# Patient Record
Sex: FEMALE | Race: BLACK OR AFRICAN AMERICAN | Employment: OTHER | URBAN - METROPOLITAN AREA
[De-identification: names, ages, dates, MRNs, and addresses within clinical notes are randomized per-mention and may not be internally consistent; named-entity substitution may affect disease eponyms.]

---

## 2017-05-16 ENCOUNTER — HOSPITAL ENCOUNTER (OUTPATIENT)
Dept: BARIATRICS/WEIGHT MGMT | Age: 57
Discharge: HOME | End: 2017-05-16

## 2017-05-17 ENCOUNTER — DOCUMENTATION ONLY (OUTPATIENT)
Dept: BARIATRICS/WEIGHT MGMT | Age: 57
End: 2017-05-17

## 2017-05-17 NOTE — PROGRESS NOTES
[address] Loss Keely DIONNE Sun [address] [facility], [address]      Patient's Name: Otriz Isaacs   Age: 60 y.o. YOB: 1960   Sex: female    Date:   5/17/2017    Height: 5 f 2 Weight:    161      Lbs. BMI: 29.5     Surgery Date: 6/27/16    Starting Weight: 247   Last Recorded Weight: 12/30/16: 180  Overall Pounds Lost: 86     Procedure: Gastric Bypass    Lowest Weight patient has achieved since surgery: Current    How long has patient been at this weight for: 2 wekes    Other Pertinent Information:     Diet History (reported by patient on diet history form)    Do you smoke: None    Alcohol Intake: 1 drinks at a time, Not often times a week. Meals per day: 3    Portion sizes ~: Whole meal is the size of a deck of card    Simple sugar intake: She states she is not regularly eating sweets. Experiencing dumping syndrome: She states she has had something sweet and did indeed getting dumping syndrome    Carbohydrate intake: Not routinely    Symptoms that occur when carbohydrates are consumed: None    Ounces of fluid consumed per day: 32-64 ounces. Discussed with patient focusing on 64 ounces regluarly    Fluids being consumed: Water and 1 cup of coffee per day    Patient is sometimes  drinking protein drinks    Patient is snacking on: fruit or veggie straws    Exercise History    Patient is doing stair steps for exercise. Vitamins    Patient is taking 2 Multivitamins per day    Patient is taking Calcium in the form of pill. Patient is taking 315 mg per day. Patient is taking 1000 mcg of Vitamin B12. Patient is taking 2000 IU of Vitamin D 3 today. Summary:  Patient attended 9 month post-op class. Weight loss is at 86 pounds. I have reinforced the key diet principles to the patient. Patient was instructed to follow a 3 meal a day routine.   I have reinforced the importance of filling up on meat and vegetables and avoiding carbohydrates. I have talked with patient about reactive hypoglycemia and although carbohydrates are not good from a weight-management point of view, they are actually very dangerous and should be avoided. Patient was discouraged from still doing protein drinks at this point and was encouraged to eat solid food of meat and vegetables. If patient is getting hungry between meals focus on meat and vegetables and a list of food choices was reviewed with patient. Reinforced to patient the importance of being properly hydrated and the need to get 64 ounces of fluid in per day. Reinforced to patient the need to do 30 minutes of exercise per day. We also spent some time talking about the vitamins and the importance of taking them. Reinforced the dosage of vitamins to patient. Patient was reminded that the vitamins are lifelong. Other Pertinent Information: Patient was encouraged to increase fluid intake and watch her fruit intake.   Re-educated on calcium dosing      Michael Obrien 87 RD  5/17/2017

## 2017-06-28 ENCOUNTER — TELEPHONE (OUTPATIENT)
Dept: SURGERY | Age: 57
End: 2017-06-28

## 2017-06-29 ENCOUNTER — OFFICE VISIT (OUTPATIENT)
Dept: SURGERY | Age: 57
End: 2017-06-29

## 2017-06-29 ENCOUNTER — HOSPITAL ENCOUNTER (OUTPATIENT)
Dept: LAB | Age: 57
Discharge: HOME | End: 2017-06-29
Payer: COMMERCIAL

## 2017-06-29 VITALS
WEIGHT: 156 LBS | BODY MASS INDEX: 28.71 KG/M2 | SYSTOLIC BLOOD PRESSURE: 112 MMHG | HEART RATE: 73 BPM | TEMPERATURE: 97.7 F | DIASTOLIC BLOOD PRESSURE: 82 MMHG | OXYGEN SATURATION: 98 % | HEIGHT: 62 IN | RESPIRATION RATE: 16 BRPM

## 2017-06-29 DIAGNOSIS — R53.82 CHRONIC FATIGUE: ICD-10-CM

## 2017-06-29 DIAGNOSIS — Z98.84 GASTRIC BYPASS STATUS FOR OBESITY: ICD-10-CM

## 2017-06-29 DIAGNOSIS — K91.2 POSTOPERATIVE MALABSORPTION: ICD-10-CM

## 2017-06-29 DIAGNOSIS — K91.2 POSTOPERATIVE MALABSORPTION: Primary | ICD-10-CM

## 2017-06-29 LAB
25(OH)D3 SERPL-MCNC: 37.6 NG/ML (ref 30–100)
ALBUMIN SERPL BCP-MCNC: 3.7 G/DL (ref 3.4–5)
ALBUMIN/GLOB SERPL: 1.4 {RATIO} (ref 0.8–1.7)
ALP SERPL-CCNC: 90 U/L (ref 45–117)
ALT SERPL-CCNC: 41 U/L (ref 13–56)
ANION GAP BLD CALC-SCNC: 7 MMOL/L (ref 3–18)
AST SERPL W P-5'-P-CCNC: 28 U/L (ref 15–37)
BASOPHILS # BLD AUTO: 0 K/UL (ref 0–0.06)
BASOPHILS # BLD: 0 % (ref 0–2)
BILIRUB SERPL-MCNC: 0.4 MG/DL (ref 0.2–1)
BUN SERPL-MCNC: 24 MG/DL (ref 7–18)
BUN/CREAT SERPL: 28 (ref 12–20)
CALCIUM SERPL-MCNC: 9.2 MG/DL (ref 8.5–10.1)
CHLORIDE SERPL-SCNC: 108 MMOL/L (ref 100–108)
CO2 SERPL-SCNC: 29 MMOL/L (ref 21–32)
CREAT SERPL-MCNC: 0.87 MG/DL (ref 0.6–1.3)
DIFFERENTIAL METHOD BLD: NORMAL
EOSINOPHIL # BLD: 0.3 K/UL (ref 0–0.4)
EOSINOPHIL NFR BLD: 4 % (ref 0–5)
ERYTHROCYTE [DISTWIDTH] IN BLOOD BY AUTOMATED COUNT: 13.8 % (ref 11.6–14.5)
FERRITIN SERPL-MCNC: 34 NG/ML (ref 8–388)
FOLATE SERPL-MCNC: >20 NG/ML (ref 3.1–17.5)
GLOBULIN SER CALC-MCNC: 2.6 G/DL (ref 2–4)
GLUCOSE SERPL-MCNC: 77 MG/DL (ref 74–99)
HCT VFR BLD AUTO: 38.2 % (ref 35–45)
HGB BLD-MCNC: 12.7 G/DL (ref 12–16)
IRON SERPL-MCNC: 85 UG/DL (ref 50–175)
LYMPHOCYTES # BLD AUTO: 32 % (ref 21–52)
LYMPHOCYTES # BLD: 2.2 K/UL (ref 0.9–3.6)
MCH RBC QN AUTO: 30 PG (ref 24–34)
MCHC RBC AUTO-ENTMCNC: 33.2 G/DL (ref 31–37)
MCV RBC AUTO: 90.1 FL (ref 74–97)
MONOCYTES # BLD: 0.4 K/UL (ref 0.05–1.2)
MONOCYTES NFR BLD AUTO: 6 % (ref 3–10)
NEUTS SEG # BLD: 4.1 K/UL (ref 1.8–8)
NEUTS SEG NFR BLD AUTO: 58 % (ref 40–73)
PLATELET # BLD AUTO: 305 K/UL (ref 135–420)
PMV BLD AUTO: 10.5 FL (ref 9.2–11.8)
POTASSIUM SERPL-SCNC: 4.1 MMOL/L (ref 3.5–5.5)
PROT SERPL-MCNC: 6.3 G/DL (ref 6.4–8.2)
RBC # BLD AUTO: 4.24 M/UL (ref 4.2–5.3)
SODIUM SERPL-SCNC: 144 MMOL/L (ref 136–145)
VIT B12 SERPL-MCNC: >2000 PG/ML (ref 211–911)
WBC # BLD AUTO: 7 K/UL (ref 4.6–13.2)

## 2017-06-29 PROCEDURE — 83540 ASSAY OF IRON: CPT | Performed by: PHYSICIAN ASSISTANT

## 2017-06-29 PROCEDURE — 82306 VITAMIN D 25 HYDROXY: CPT | Performed by: PHYSICIAN ASSISTANT

## 2017-06-29 PROCEDURE — 36415 COLL VENOUS BLD VENIPUNCTURE: CPT | Performed by: PHYSICIAN ASSISTANT

## 2017-06-29 PROCEDURE — 82728 ASSAY OF FERRITIN: CPT | Performed by: PHYSICIAN ASSISTANT

## 2017-06-29 PROCEDURE — 84425 ASSAY OF VITAMIN B-1: CPT | Performed by: PHYSICIAN ASSISTANT

## 2017-06-29 PROCEDURE — 85025 COMPLETE CBC W/AUTO DIFF WBC: CPT | Performed by: PHYSICIAN ASSISTANT

## 2017-06-29 PROCEDURE — 80053 COMPREHEN METABOLIC PANEL: CPT | Performed by: PHYSICIAN ASSISTANT

## 2017-06-29 PROCEDURE — 82607 VITAMIN B-12: CPT | Performed by: PHYSICIAN ASSISTANT

## 2017-06-29 RX ORDER — MENTHOL
1000 GEL (GRAM) TOPICAL DAILY
COMMUNITY

## 2017-06-30 NOTE — PROGRESS NOTES
Blanca Rodriguez is a 59 y.o. female is now 1 years status post laparoscopic gastric bypass surgery. Doing well overall. Currently on a solid meats/veggies diet without difficulty. Taking in 60+oz water,  50+g protein. 30min of activity 3 days a week. Bowel movements are regular. The patient is not having any pain. . She is compliant with multivitamins, calcium, Vit D and B12 supplements. She is in [address] and is doing well      Weight Loss Metrics 6/29/2017 12/29/2016 9/26/2016 7/14/2016 6/28/2016 6/27/2016 6/6/2016   Pre op / Initial Wt - - - - - - -   Today's Wt 156 lb 180 lb 195 lb 220 lb 232 lb 3.2 oz - 234 lb   BMI 28.53 kg/m2 32.92 kg/m2 35.67 kg/m2 40.23 kg/m2 - 42.46 kg/m2 45.7 kg/m2   Ideal Body Wt - - - - - - -   Excess Body Wt - - - - - - -   Goal Wt - - - - - - -   Wt loss to date - - - - - - -   % Wt Loss - - - - - - -   80% EBW - - - - - - -       Body mass index is 28.53 kg/(m^2). Comorbidities:    Hypertension: resolved  Diabetes: resolved  Obstructive Sleep Apnea: resolved  Hyperlipidemia: not applicable  Stress Urinary Incontinence: not applicable  Gastroesophageal Reflux: not applicable  Weight related arthropathy:not applicable        Past Medical History:   Diagnosis Date    Adverse effect of anesthesia     woke up during cholecystectomy    Asthma     Diabetes ([facility] [address]     Hypertension     Sleep apnea     uses cpap        Past Surgical History:   Procedure Laterality Date    HX ABDOMINAL LAPAROSCOPY  1978    HX APPENDECTOMY      HX CHOLECYSTECTOMY      HX HYSTERECTOMY      LAP GASTRIC BYPASS/NEAL-EN-Y  6-27-16    Dr. Telly Alamo       Current Outpatient Prescriptions   Medication Sig Dispense Refill    vitamin e (E GEMS) 1,000 unit capsule Take 1,000 Units by mouth daily.  potassium 99 mg tablet Take 99 mg by mouth daily.  cyanocobalamin (VITAMIN B-12) 1,000 mcg sublingual tablet Take 1,000 mcg by mouth daily.       cholecalciferol, vitamin D3, 2,000 unit tab Take  by mouth.  pediatric multivitamins chewable tablet Take 1 Tab by mouth daily.  calcium citrate 200 mg (950 mg) tablet Take 500 mg by mouth daily. Allergies   Allergen Reactions    Codeine Itching     Migraine    Erythromycin Nausea and Vomiting    Percocet [Oxycodone-Acetaminophen] Hives     Denies at present. Thinks hives were from stress. ROS:  Review of Systems   All other systems reviewed and are negative. Physicial Exam:  Visit Vitals    /82    Pulse 73    Temp 97.7 °F (36.5 °C) (Oral)    Resp 16    Ht 5' 2\" (1.575 m)    Wt 70.8 kg (156 lb)    SpO2 98%    BMI 28.53 kg/m2     Physical Exam   Constitutional: She is oriented to person, place, and time and well-developed, well-nourished, and in no distress. Pulmonary/Chest: Effort normal.   Abdominal: Soft. She exhibits no distension and no mass. There is no tenderness. There is no rebound and no guarding. Musculoskeletal: Normal range of motion. Neurological: She is alert and oriented to person, place, and time. Gait normal.   Skin: Skin is warm and dry. No rash noted. No erythema. No pallor. Psychiatric: Mood, memory, affect and judgment normal.       Labs: not done    Assessment/Plan: Pt is currently 1 year s/p laparoscopic gastric bypass surgery with a total weight loss of 78lbs to date, doing well overall  Labs now  Stressed importance of hydration with SF clear liquids until urine clear. cont with food content mainly meats/veggies. Encouraged support group attendance  Advised exercise program of 150 minutes/wk    Counseling>50% of 30 minute visit  F/u 6 months, labs prior, sooner as needed.   [address] [facility], [place]-

## 2017-07-01 LAB — VIT B1 BLD-SCNC: 142.5 NMOL/L (ref 66.5–200)

## 2017-07-13 DIAGNOSIS — K91.2 POSTSURGICAL MALABSORPTION: ICD-10-CM

## 2017-07-13 DIAGNOSIS — E66.01 MORBID OBESITY WITH BMI OF 40.0-44.9, ADULT (HCC): ICD-10-CM

## 2018-01-15 ENCOUNTER — OFFICE VISIT (OUTPATIENT)
Dept: SURGERY | Age: 58
End: 2018-01-15

## 2018-01-15 VITALS
HEART RATE: 66 BPM | DIASTOLIC BLOOD PRESSURE: 86 MMHG | BODY MASS INDEX: 28.13 KG/M2 | RESPIRATION RATE: 18 BRPM | WEIGHT: 153.8 LBS | SYSTOLIC BLOOD PRESSURE: 130 MMHG | TEMPERATURE: 98.7 F

## 2018-01-15 DIAGNOSIS — K91.2 POSTOPERATIVE MALABSORPTION: Primary | ICD-10-CM

## 2018-01-15 DIAGNOSIS — Z98.84 GASTRIC BYPASS STATUS FOR OBESITY: ICD-10-CM

## 2018-01-15 RX ORDER — GLUCOSAMINE/CHONDR SU A SOD 750-600 MG
TABLET ORAL
COMMUNITY

## 2018-01-15 RX ORDER — ASCORBIC ACID 500 MG
1000 TABLET ORAL DAILY
COMMUNITY

## 2018-01-15 NOTE — PROGRESS NOTES
Benedict Vallejo is a 59 y.o. female is now 18 months status post laparoscopic gastric bypass surgery. Doing well overall. Currently on a solid meats/veggies diet without difficulty. Taking in 50+oz water,  60+g protein. 60min of activity 7 days a week. Bowel movements are alternating constipation and regularity. The patient is not having any pain. . She is compliant with multivitamins, calcium, Vit D and B12 supplements. Weight Loss Metrics 1/15/2018 6/29/2017 12/29/2016 9/26/2016 7/14/2016 6/28/2016 6/27/2016   Pre op / Initial Wt - - - - - - -   Today's Wt 153 lb 12.8 oz 156 lb 180 lb 195 lb 220 lb 232 lb 3.2 oz -   BMI 28.13 kg/m2 28.53 kg/m2 32.92 kg/m2 35.67 kg/m2 40.23 kg/m2 - 42.46 kg/m2   Ideal Body Wt - - - - - - -   Excess Body Wt - - - - - - -   Goal Wt - - - - - - -   Wt loss to date - - - - - - -   % Wt Loss - - - - - - -   80% EBW - - - - - - -       Body mass index is 28.13 kg/(m^2). Comorbidities:    Hypertension: resolved  Diabetes: resolved  Obstructive Sleep Apnea: resolved  Hyperlipidemia: not applicable  Stress Urinary Incontinence: not applicable  Gastroesophageal Reflux: not applicable  Weight related arthropathy:not applicable        Past Medical History:   Diagnosis Date    Adverse effect of anesthesia     woke up during cholecystectomy    Asthma     Diabetes ([facility] [address]     Hypertension     Sleep apnea     uses cpap        Past Surgical History:   Procedure Laterality Date    HX ABDOMINAL LAPAROSCOPY  1978    HX APPENDECTOMY      HX CHOLECYSTECTOMY      HX HYSTERECTOMY      LAP GASTRIC BYPASS/NEAL-EN-Y  6-27-16    Dr. Shelia Galeas       Current Outpatient Prescriptions   Medication Sig Dispense Refill    Biotin 2,500 mcg cap Take  by mouth.  ascorbic acid, vitamin C, (VITAMIN C) 500 mg tablet Take  by mouth.  vitamin e (E GEMS) 1,000 unit capsule Take 1,000 Units by mouth daily.  potassium 99 mg tablet Take 99 mg by mouth daily.       cyanocobalamin (VITAMIN B-12) 1,000 mcg sublingual tablet Take 1,000 mcg by mouth daily.  cholecalciferol, vitamin D3, 2,000 unit tab Take  by mouth.  pediatric multivitamins chewable tablet Take 1 Tab by mouth daily.  calcium citrate 200 mg (950 mg) tablet Take 500 mg by mouth daily. Allergies   Allergen Reactions    Codeine Itching     Migraine    Erythromycin Nausea and Vomiting    Percocet [Oxycodone-Acetaminophen] Hives     Denies at present. Thinks hives were from stress. ROS:  Review of Systems   Gastrointestinal: Positive for constipation. All other systems reviewed and are negative. Physicial Exam:  Visit Vitals    /86    Pulse 66    Temp 98.7 °F (37.1 °C)    Resp 18    Wt 69.8 kg (153 lb 12.8 oz)    BMI 28.13 kg/m2     Physical Exam   Constitutional: She is oriented to person, place, and time and well-developed, well-nourished, and in no distress. Pulmonary/Chest: Effort normal.   Abdominal: Soft. She exhibits no distension and no mass. There is no tenderness. There is no rebound and no guarding. Musculoskeletal: Normal range of motion. Neurological: She is alert and oriented to person, place, and time. Gait normal.   Skin: Skin is warm and dry. Psychiatric: Mood, memory, affect and judgment normal.       Labs: not performed for this appt--last labs 6/17 wnl    Assessment/Plan: Pt is currently 18 months s/p laparoscopic gastric bypass surgery with a total weight loss of 97lbs to date, doing very well  Labs were reviewed and pt was instructed to cont all supplements  Stressed importance of hydration with SF clear liquids until urine clear. Cont with food content mainly meats/veggies. Encouraged support group attendance  Advised cont excellent exercise program  Counseling>50% of 30 minute visit  F/u 6 months, labs prior, sooner as needed.   [facility], PA-C

## 2018-01-15 NOTE — MR AVS SNAPSHOT
[address] [facility] 240 200 [facility] 
[phone] Patient: Judge Alvarado MRN: [id number] Visit Information Date & Time Provider Department Dept. Phone Encounter #  
 1/15/2018  3:00 PM NABOR Vasquez [phone] Your Appointments 7/16/2018  9:00 AM  
Follow Up with NABOR Nye 33 ([facility]) Appt Note: 6 month f/up [address] [place] [address] [id number] [address] [facility] Upcoming Health Maintenance Date Due Hepatitis C Screening 1960 FOOT EXAM Q1 6/17/1968 EYE EXAM RETINAL OR DILATED Q1 6/17/1968 Pneumococcal 19-64 Medium Risk (1 of 1 - PPSV23) 6/17/1977 DTaP/Tdap/Td series (1 - Tdap) 6/17/1979 PAP AKA CERVICAL CYTOLOGY 6/17/1979 BREAST CANCER SCRN MAMMOGRAM 6/17/2008 FOBT Q 1 YEAR AGE 50-75 6/17/2008 LIPID PANEL Q1 6/3/2011 HEMOGLOBIN A1C Q6M 2/15/2017 Influenza Age 5 to Adult 8/1/2017 MICROALBUMIN Q1 8/16/2017 Allergies as of 1/15/2018  Review Complete On: 1/15/2018 By: Gissell Smith PA-C Severity Noted Reaction Type Reactions Codeine  11/17/2015    Itching Migraine Erythromycin  11/17/2015    Nausea and Vomiting Percocet [Oxycodone-acetaminophen]  12/17/2015    Hives Denies at present. Thinks hives were from stress. Current Immunizations  Never Reviewed No immunizations on file. Not reviewed this visit You Were Diagnosed With   
  
 Codes Comments Postoperative malabsorption    -  Primary ICD-10-CM: K91.2 ICD-9-CM: 579.3 Gastric bypass status for obesity     ICD-10-CM: Z98.84 ICD-9-CM: V45.86 Vitals BP Pulse Temp Resp Weight(growth percentile) BMI 130/86 66 98.7 °F (37.1 °C) 18 153 lb 12.8 oz (69.8 kg) 28.13 kg/m2 OB Status Smoking Status Hysterectomy Never Smoker Vitals History BMI and BSA Data Body Mass Index Body Surface Area  
 28.13 kg/m 2 1.75 m 2 Preferred Pharmacy Pharmacy Name Phone [address] [place] [address]. [phone] Your Updated Medication List  
  
   
This list is accurate as of: 1/15/18  4:10 PM.  Always use your most recent med list.  
  
  
  
  
 Biotin 2,500 mcg Cap Take  by mouth.  
  
 calcium citrate 200 mg (950 mg) tablet Take 500 mg by mouth daily. cholecalciferol (vitamin D3) 2,000 unit Tab Take  by mouth.  
  
 pediatric multivitamins chewable tablet Take 1 Tab by mouth daily. potassium 99 mg tablet Take 99 mg by mouth daily. VITAMIN B-12 1,000 mcg sublingual tablet Generic drug:  cyanocobalamin Take 1,000 mcg by mouth daily. VITAMIN C 500 mg tablet Generic drug:  ascorbic acid (vitamin C) Take  by mouth.  
  
 vitamin e 1,000 unit capsule Commonly known as:  E GEMS Take 1,000 Units by mouth daily. To-Do List   
 01/15/2018 Lab:  CBC WITH AUTOMATED DIFF   
  
 01/15/2018 Lab:  FERRITIN   
  
 01/15/2018 Lab:  IRON   
  
 01/15/2018 Lab:  METABOLIC PANEL, COMPREHENSIVE   
  
 01/15/2018 Lab:  VITAMIN B1, WHOLE BLOOD   
  
 01/15/2018 Lab:  VITAMIN B12 & FOLATE   
  
 06/15/2018 Lab:  VITAMIN D, 25 HYDROXY Introducing [facility] & HE[facility] SERVICES! Carmelita Fothergill introduces LYCEEM patient portal. Now you can access parts of your medical record, email your doctor's office, and request medication refills online. 1. In your internet browser, go to [email or web address] 2. Click on the First Time User? Click Here link in the Sign In box. You will see the New Member Sign Up page. 3. Enter your Telebit Access Code exactly as it appears below. You will not need to use this code after youve completed the sign-up process. If you do not sign up before the expiration date, you must request a new code. · Telebit Access Code: [email or web address] Expires: 4/15/2018  3:34 PM 
 
4. Enter the last four digits of your Social Security Number (xxxx) and Date of Birth (mm/dd/yyyy) as indicated and click Submit. You will be taken to the next sign-up page. 5. Create a Casabit ID. This will be your Telebit login ID and cannot be changed, so think of one that is secure and easy to remember. 6. Create a Telebit password. You can change your password at any time. 7. Enter your Password Reset Question and Answer. This can be used at a later time if you forget your password. 8. Enter your e-mail address. You will receive e-mail notification when new information is available in [address] 9. Click Sign Up. You can now view and download portions of your medical record. 10. Click the Download Summary menu link to download a portable copy of your medical information. If you have questions, please visit the Frequently Asked Questions section of the Telebit website. Remember, Telebit is NOT to be used for urgent needs. For medical emergencies, dial 911. Now available from your iPhone and Android! Please provide this summary of care documentation to your next provider. Your primary care clinician is listed as Sheryl Castillo. If you have any questions after today's visit, please call [phone].

## 2018-07-11 ENCOUNTER — HOSPITAL ENCOUNTER (OUTPATIENT)
Dept: LAB | Age: 58
Discharge: HOME | End: 2018-07-11

## 2018-07-11 PROCEDURE — 99001 SPECIMEN HANDLING PT-LAB: CPT | Performed by: PHYSICIAN ASSISTANT

## 2018-07-14 LAB
25(OH)D3+25(OH)D2 SERPL-MCNC: 32 NG/ML (ref 30–100)
ALBUMIN SERPL-MCNC: 3.5 G/DL (ref 3.6–4.8)
ALBUMIN/GLOB SERPL: 1.9 {RATIO} (ref 1.2–2.2)
ALP SERPL-CCNC: 76 IU/L (ref 39–117)
ALT SERPL-CCNC: 39 IU/L (ref 0–32)
AST SERPL-CCNC: 36 IU/L (ref 0–40)
BASOPHILS # BLD AUTO: 0 X10E3/UL (ref 0–0.2)
BASOPHILS NFR BLD AUTO: 0 %
BILIRUB SERPL-MCNC: 0.4 MG/DL (ref 0–1.2)
BUN SERPL-MCNC: 16 MG/DL (ref 8–27)
BUN/CREAT SERPL: 17 (ref 12–28)
CALCIUM SERPL-MCNC: 9.8 MG/DL (ref 8.7–10.3)
CHLORIDE SERPL-SCNC: 105 MMOL/L (ref 96–106)
CO2 SERPL-SCNC: 27 MMOL/L (ref 20–29)
CREAT SERPL-MCNC: 0.94 MG/DL (ref 0.57–1)
EOSINOPHIL # BLD AUTO: 0.5 X10E3/UL (ref 0–0.4)
EOSINOPHIL NFR BLD AUTO: 8 %
ERYTHROCYTE [DISTWIDTH] IN BLOOD BY AUTOMATED COUNT: 15.6 % (ref 12.3–15.4)
FERRITIN SERPL-MCNC: 20 NG/ML (ref 15–150)
FOLATE SERPL-MCNC: >20 NG/ML
GLOBULIN SER CALC-MCNC: 1.8 G/DL (ref 1.5–4.5)
GLUCOSE SERPL-MCNC: 101 MG/DL (ref 65–99)
HCT VFR BLD AUTO: 42.3 % (ref 34–46.6)
HGB BLD-MCNC: 13.7 G/DL (ref 11.1–15.9)
IMM GRANULOCYTES # BLD: 0 X10E3/UL (ref 0–0.1)
IMM GRANULOCYTES NFR BLD: 0 %
IRON SERPL-MCNC: 224 UG/DL (ref 27–159)
LYMPHOCYTES # BLD AUTO: 2.4 X10E3/UL (ref 0.7–3.1)
LYMPHOCYTES NFR BLD AUTO: 39 %
MCH RBC QN AUTO: 29.2 PG (ref 26.6–33)
MCHC RBC AUTO-ENTMCNC: 32.4 G/DL (ref 31.5–35.7)
MCV RBC AUTO: 90 FL (ref 79–97)
MONOCYTES # BLD AUTO: 0.4 X10E3/UL (ref 0.1–0.9)
MONOCYTES NFR BLD AUTO: 7 %
NEUTROPHILS # BLD AUTO: 2.9 X10E3/UL (ref 1.4–7)
NEUTROPHILS NFR BLD AUTO: 46 %
PLATELET # BLD AUTO: 283 X10E3/UL (ref 150–379)
POTASSIUM SERPL-SCNC: 4.6 MMOL/L (ref 3.5–5.2)
PROT SERPL-MCNC: 5.3 G/DL (ref 6–8.5)
RBC # BLD AUTO: 4.69 X10E6/UL (ref 3.77–5.28)
SODIUM SERPL-SCNC: 145 MMOL/L (ref 134–144)
VIT B1 BLD-SCNC: 128.3 NMOL/L (ref 66.5–200)
VIT B12 SERPL-MCNC: >2000 PG/ML (ref 232–1245)
WBC # BLD AUTO: 6.2 X10E3/UL (ref 3.4–10.8)

## 2018-07-16 NOTE — PROGRESS NOTES
Pt's Protein and Albumin low/trending down. Please see if she can increase her pro intake and I will work with her on this as well @ our upcoming visit.

## 2018-07-18 NOTE — PROGRESS NOTES
Contacted patient. Patient stated she hasn't had much of an appetite, but we talked about ways to increase her protein status. She also stated she recently started drinking a 30 gram shake again. Will monitor.     Ivonne Vásquez MS RD

## 2018-07-25 ENCOUNTER — OFFICE VISIT (OUTPATIENT)
Dept: SURGERY | Age: 58
End: 2018-07-25

## 2018-07-25 VITALS
RESPIRATION RATE: 16 BRPM | TEMPERATURE: 98.1 F | DIASTOLIC BLOOD PRESSURE: 82 MMHG | BODY MASS INDEX: 28.52 KG/M2 | WEIGHT: 155 LBS | HEIGHT: 62 IN | HEART RATE: 66 BPM | SYSTOLIC BLOOD PRESSURE: 124 MMHG

## 2018-07-25 DIAGNOSIS — Z98.84 GASTRIC BYPASS STATUS FOR OBESITY: ICD-10-CM

## 2018-07-25 DIAGNOSIS — R11.0 NAUSEA: ICD-10-CM

## 2018-07-25 DIAGNOSIS — K91.2 POSTOPERATIVE MALABSORPTION: Primary | ICD-10-CM

## 2018-07-25 DIAGNOSIS — K91.1 POSTSURGICAL DUMPING SYNDROME: ICD-10-CM

## 2018-07-25 NOTE — MR AVS SNAPSHOT
[address], [place] [address] 200 [facility] 
[phone] Patient: Vandana Benavides MRN: [id number] Visit Information Date & Time Provider Department Dept. Phone Encounter #  
 7/25/2018 10:00 AM Breanne Arenas  [facility] [place] [phone] [id number] Your Appointments 10/17/2018 10:30 AM  
Follow Up with Breanne Arenas  [facility] [place] ([facility] Appt Note: 3 month f/up 2300 [facility] Pia Martinez [place] [address] [facility] [place] [address] [place] [address] 200 [facility] Upcoming Health Maintenance Date Due Hepatitis C Screening 1960 FOOT EXAM Q1 6/17/1968 EYE EXAM RETINAL OR DILATED Q1 6/17/1968 Pneumococcal 19-64 Medium Risk (1 of 1 - PPSV23) 6/17/1977 DTaP/Tdap/Td series (1 - Tdap) 6/17/1979 PAP AKA CERVICAL CYTOLOGY 6/17/1979 BREAST CANCER SCRN MAMMOGRAM 6/17/2008 FOBT Q 1 YEAR AGE 50-75 6/17/2008 LIPID PANEL Q1 6/3/2011 HEMOGLOBIN A1C Q6M 2/15/2017 MICROALBUMIN Q1 8/16/2017 ZOSTER VACCINE AGE 60> 4/17/2018 Influenza Age 5 to Adult 8/1/2018 Allergies as of 7/25/2018  Review Complete On: 7/25/2018 By: Jerome Merino. Early, LPN Severity Noted Reaction Type Reactions Codeine  11/17/2015    Itching Migraine Erythromycin  11/17/2015    Nausea and Vomiting Percocet [Oxycodone-acetaminophen]  12/17/2015    Hives Denies at present. Thinks hives were from stress. Current Immunizations  Never Reviewed No immunizations on file. Not reviewed this visit Vitals BP Pulse Temp Resp Height(growth percentile) Weight(growth percentile) 124/82 66 98.1 °F (36.7 °C) (Oral) 16 5' 2\" (1.575 m) 155 lb (70.3 kg) BMI OB Status Smoking Status 28.35 kg/m2 Hysterectomy Never Smoker Vitals History BMI and BSA Data Body Mass Index Body Surface Area  
 28.35 kg/m 2 1.75 m 2 Preferred Pharmacy Pharmacy Name Phone [address] [place] [address]. [phone] Your Updated Medication List  
  
   
This list is accurate as of 7/25/18 10:42 AM.  Always use your most recent med list.  
  
  
  
  
 Biotin 2,500 mcg Cap Take  by mouth.  
  
 calcium citrate 200 mg (950 mg) tablet Take 500 mg by mouth daily. cholecalciferol (vitamin D3) 2,000 unit Tab Take  by mouth.  
  
 pediatric multivitamins chewable tablet Take 1 Tab by mouth daily. potassium 99 mg tablet Take 99 mg by mouth daily. VITAMIN B-12 1,000 mcg sublingual tablet Generic drug:  cyanocobalamin Take 1,000 mcg by mouth daily. VITAMIN C 500 mg tablet Generic drug:  ascorbic acid (vitamin C) Take  by mouth.  
  
 vitamin e 1,000 unit capsule Commonly known as:  E GEMS Take 1,000 Units by mouth daily. Introducing [facility]! [facility] introduces Coupsta patient portal. Now you can access parts of your medical record, email your doctor's office, and request medication refills online. 1. In your internet browser, go to [email or web address] 2. Click on the First Time User? Click Here link in the Sign In box. You will see the New Member Sign Up page. 3. Enter your Coupsta Access Code exactly as it appears below. You will not need to use this code after youve completed the sign-up process. If you do not sign up before the expiration date, you must request a new code. · Coupsta Access Code: [email or web address] Expires: 10/23/2018  9:46 AM 
 
4. Enter the last four digits of your Social Security Number (xxxx) and Date of Birth (mm/dd/yyyy) as indicated and click Submit. You will be taken to the next sign-up page. 5. Create a StackAdaptt ID.  This will be your StackAdaptt login ID and cannot be changed, so think of one that is secure and easy to remember. 6. Create a Last 2 Left password. You can change your password at any time. 7. Enter your Password Reset Question and Answer. This can be used at a later time if you forget your password. 8. Enter your e-mail address. You will receive e-mail notification when new information is available in [address] 9. Click Sign Up. You can now view and download portions of your medical record. 10. Click the Download Summary menu link to download a portable copy of your medical information. If you have questions, please visit the Frequently Asked Questions section of the Last 2 Left website. Remember, Last 2 Left is NOT to be used for urgent needs. For medical emergencies, dial 911. Now available from your iPhone and Android! Please provide this summary of care documentation to your next provider. Your primary care clinician is listed as Saji Geller. If you have any questions after today's visit, please call [phone].

## 2018-07-31 NOTE — PROGRESS NOTES
Raj Chapman is a 59 y.o. female is now 18 months status post laparoscopic gastric bypass surgery. Doing well overall. Currently on a stage 4 meats/veggies diet with minor difficulty. She notes dumping/hot flashs/ nausea with CHO intake. Taking in 50+oz water,  60+g protein. 60min of activity 2 days a week. Bowel movements are alternating constipation and regularity. The patient is not having any pain. . She is compliant with multivitamins, calcium, Vit D and B12 supplements. Weight Loss Metrics 7/25/2018 1/15/2018 6/29/2017 12/29/2016 9/26/2016 7/14/2016 6/28/2016   Pre op / Initial Wt - - - - - - -   Today's Wt 155 lb 153 lb 12.8 oz 156 lb 180 lb 195 lb 220 lb 232 lb 3.2 oz   BMI 28.35 kg/m2 28.13 kg/m2 28.53 kg/m2 32.92 kg/m2 35.67 kg/m2 40.23 kg/m2 -   Ideal Body Wt - - - - - - -   Excess Body Wt - - - - - - -   Goal Wt - - - - - - -   Wt loss to date - - - - - - -   % Wt Loss - - - - - - -   80% EBW - - - - - - -       Body mass index is 28.35 kg/(m^2). Comorbidities:    Hypertension: resolved  Diabetes: resolved  Obstructive Sleep Apnea: resolved  Hyperlipidemia: not applicable  Stress Urinary Incontinence: not applicable  Gastroesophageal Reflux: not applicable  Weight related arthropathy:not applicable        Past Medical History:   Diagnosis Date    Adverse effect of anesthesia     woke up during cholecystectomy    Asthma     Diabetes ([facility] [address]     Hypertension     Sleep apnea     uses cpap        Past Surgical History:   Procedure Laterality Date    HX ABDOMINAL LAPAROSCOPY  1978    HX APPENDECTOMY      HX CHOLECYSTECTOMY      HX HYSTERECTOMY      LAP GASTRIC BYPASS/NEAL-EN-Y  6-27-16    Dr. Delvin Salmon       Current Outpatient Prescriptions   Medication Sig Dispense Refill    Biotin 2,500 mcg cap Take  by mouth.  ascorbic acid, vitamin C, (VITAMIN C) 500 mg tablet Take  by mouth.  vitamin e (E GEMS) 1,000 unit capsule Take 1,000 Units by mouth daily.       potassium 99 mg tablet Take 99 mg by mouth daily.  cyanocobalamin (VITAMIN B-12) 1,000 mcg sublingual tablet Take 1,000 mcg by mouth daily.  cholecalciferol, vitamin D3, 2,000 unit tab Take  by mouth.  pediatric multivitamins chewable tablet Take 1 Tab by mouth daily.  calcium citrate 200 mg (950 mg) tablet Take 500 mg by mouth daily. Allergies   Allergen Reactions    Codeine Itching     Migraine    Erythromycin Nausea and Vomiting    Percocet [Oxycodone-Acetaminophen] Hives     Denies at present. Thinks hives were from stress. ROS:  Review of Systems   Constitutional: Positive for weight loss. Intentional    HENT: Negative. Hair loss      Eyes: Negative. Respiratory: Negative. Gastrointestinal: Positive for abdominal pain, diarrhea and nausea. Negative for constipation. Abdominal pain, hot flashes, and nausea with dumping    Genitourinary: Negative. Musculoskeletal: Negative. Skin: Negative. Neurological: Negative. Psychiatric/Behavioral: Negative. Physicial Exam:  Visit Vitals    /82    Pulse 66    Temp 98.1 °F (36.7 °C) (Oral)    Resp 16    Ht 5' 2\" (1.575 m)    Wt 70.3 kg (155 lb)    BMI 28.35 kg/m2     Physical Exam   Constitutional: She is oriented to person, place, and time and well-developed, well-nourished, and in no distress. HENT:   Head: Normocephalic. Cardiovascular: Normal rate. Pulmonary/Chest: Effort normal.   Abdominal: Soft. She exhibits no distension. There is no tenderness. Musculoskeletal: Normal range of motion. Neurological: She is alert and oriented to person, place, and time. Gait normal.   Skin: Skin is warm and dry.    Psychiatric: Affect normal.       Labs: 7/11/18, reviewed , significant for      7/11/2018 10:43   Protein, total 5.3 (L)   Albumin 3.5 (L)     Sodium 145 (H)       Assessment/Plan: Pt is currently 18 months s/p laparoscopic gastric bypass surgery with a total weight loss of 97lbs to date, doing well with some infrequent dietary indiscretions . Labs were reviewed and pt was instructed to cont all supplements. Pt's Protein and Albumin low/trending down, increase pro intake, discussed way to do this. Lengthy disc re: CHO metabolism after GBP and risk of dangerous reactive hypoglycemia--urged pt to avoid CHO foods. Food list given. Stressed importance of hydration with SF clear liquids until urine clear. Encouraged support group attendance  Advised 150mins/ week of physical activity. Follow up with RD 1 mo and PRN. Follow up NP 3 mo with labs prior and PRN.      Counseling>50% of 30 minute visit  Heather Rosado NP

## 2018-10-03 DIAGNOSIS — K91.2 POSTOPERATIVE MALABSORPTION: Primary | ICD-10-CM

## 2018-10-08 ENCOUNTER — HOSPITAL ENCOUNTER (OUTPATIENT)
Dept: LAB | Age: 58
Discharge: HOME | End: 2018-10-08
Payer: OTHER GOVERNMENT

## 2018-10-08 DIAGNOSIS — K91.2 POSTOPERATIVE MALABSORPTION: ICD-10-CM

## 2018-10-08 LAB
ANION GAP SERPL CALC-SCNC: 6 MMOL/L (ref 3–18)
BASOPHILS # BLD: 0 K/UL (ref 0–0.1)
BASOPHILS NFR BLD: 0 % (ref 0–2)
BUN SERPL-MCNC: 18 MG/DL (ref 7–18)
BUN/CREAT SERPL: 22 (ref 12–20)
CALCIUM SERPL-MCNC: 9.1 MG/DL (ref 8.5–10.1)
CHLORIDE SERPL-SCNC: 110 MMOL/L (ref 100–108)
CO2 SERPL-SCNC: 29 MMOL/L (ref 21–32)
CREAT SERPL-MCNC: 0.83 MG/DL (ref 0.6–1.3)
DIFFERENTIAL METHOD BLD: ABNORMAL
EOSINOPHIL # BLD: 0.4 K/UL (ref 0–0.4)
EOSINOPHIL NFR BLD: 6 % (ref 0–5)
ERYTHROCYTE [DISTWIDTH] IN BLOOD BY AUTOMATED COUNT: 13.4 % (ref 11.6–14.5)
FERRITIN SERPL-MCNC: 14 NG/ML (ref 8–388)
FOLATE SERPL-MCNC: >20 NG/ML (ref 3.1–17.5)
GLUCOSE SERPL-MCNC: 93 MG/DL (ref 74–99)
HCT VFR BLD AUTO: 38.7 % (ref 35–45)
HGB BLD-MCNC: 12.9 G/DL (ref 12–16)
IRON SERPL-MCNC: 191 UG/DL (ref 50–175)
LYMPHOCYTES # BLD: 1.8 K/UL (ref 0.9–3.6)
LYMPHOCYTES NFR BLD: 30 % (ref 21–52)
MCH RBC QN AUTO: 30.3 PG (ref 24–34)
MCHC RBC AUTO-ENTMCNC: 33.3 G/DL (ref 31–37)
MCV RBC AUTO: 90.8 FL (ref 74–97)
MONOCYTES # BLD: 0.4 K/UL (ref 0.05–1.2)
MONOCYTES NFR BLD: 6 % (ref 3–10)
NEUTS SEG # BLD: 3.6 K/UL (ref 1.8–8)
NEUTS SEG NFR BLD: 58 % (ref 40–73)
PLATELET # BLD AUTO: 317 K/UL (ref 135–420)
PMV BLD AUTO: 10.2 FL (ref 9.2–11.8)
POTASSIUM SERPL-SCNC: 4.8 MMOL/L (ref 3.5–5.5)
RBC # BLD AUTO: 4.26 M/UL (ref 4.2–5.3)
SODIUM SERPL-SCNC: 145 MMOL/L (ref 136–145)
VIT B12 SERPL-MCNC: >2000 PG/ML (ref 211–911)
WBC # BLD AUTO: 6.2 K/UL (ref 4.6–13.2)

## 2018-10-08 PROCEDURE — 82306 VITAMIN D 25 HYDROXY: CPT | Performed by: NURSE PRACTITIONER

## 2018-10-08 PROCEDURE — 84425 ASSAY OF VITAMIN B-1: CPT | Performed by: NURSE PRACTITIONER

## 2018-10-08 PROCEDURE — 36415 COLL VENOUS BLD VENIPUNCTURE: CPT | Performed by: NURSE PRACTITIONER

## 2018-10-08 PROCEDURE — 80048 BASIC METABOLIC PNL TOTAL CA: CPT | Performed by: NURSE PRACTITIONER

## 2018-10-08 PROCEDURE — 82607 VITAMIN B-12: CPT | Performed by: NURSE PRACTITIONER

## 2018-10-08 PROCEDURE — 85025 COMPLETE CBC W/AUTO DIFF WBC: CPT | Performed by: NURSE PRACTITIONER

## 2018-10-08 PROCEDURE — 83540 ASSAY OF IRON: CPT | Performed by: NURSE PRACTITIONER

## 2018-10-08 PROCEDURE — 82728 ASSAY OF FERRITIN: CPT | Performed by: NURSE PRACTITIONER

## 2018-10-10 LAB — VIT B1 BLD-SCNC: 146.2 NMOL/L (ref 66.5–200)

## 2018-10-13 LAB
25(OH)D2 SERPL-MCNC: <1 NG/ML
25(OH)D3 SERPL-MCNC: 36 NG/ML
25(OH)D3+25(OH)D2 SERPL-MCNC: 36 NG/ML

## 2018-10-16 ENCOUNTER — OFFICE VISIT (OUTPATIENT)
Dept: SURGERY | Age: 58
End: 2018-10-16

## 2018-10-16 VITALS
TEMPERATURE: 98.1 F | HEART RATE: 74 BPM | WEIGHT: 154 LBS | SYSTOLIC BLOOD PRESSURE: 134 MMHG | BODY MASS INDEX: 30.23 KG/M2 | HEIGHT: 60 IN | RESPIRATION RATE: 18 BRPM | DIASTOLIC BLOOD PRESSURE: 84 MMHG

## 2018-10-16 DIAGNOSIS — Z98.84 GASTRIC BYPASS STATUS FOR OBESITY: ICD-10-CM

## 2018-10-16 DIAGNOSIS — K91.2 POSTOPERATIVE MALABSORPTION: Primary | ICD-10-CM

## 2018-10-16 DIAGNOSIS — R10.9 ABDOMINAL PAIN, UNSPECIFIED ABDOMINAL LOCATION: ICD-10-CM

## 2018-10-16 DIAGNOSIS — K91.1 POSTSURGICAL DUMPING SYNDROME: ICD-10-CM

## 2018-10-16 NOTE — PROGRESS NOTES
Cynthia Caputo is a 59 y.o. female is now 2 years status post laparoscopic gastric bypass surgery. C/o beef sitting \"heavy\". Drinking fruit smoothies. Taking in 20-30 oz water,  60+g protein. 60min of activity 2 days a week. Bowel movements are alternating constipation and regularity. The patient is not having any pain. . She is compliant with multivitamins, calcium, Vit D and B12 supplements. Weight Loss Metrics 11/20/2018 11/20/2018 10/16/2018 10/16/2018 7/25/2018 1/15/2018 6/29/2017   Pre op / Initial Wt 234 - 234 - - - -   Today's Wt - 156 lb - 154 lb 155 lb 153 lb 12.8 oz 156 lb   BMI - 30.47 kg/m2 - 30.08 kg/m2 28.35 kg/m2 28.13 kg/m2 28.53 kg/m2   Ideal Body Wt 120 - 119.5 - - - -   Excess Body Wt 114 - 114.5 - - - -   Goal Wt 142 - 143 - - - -   Wt loss to date 78 - 80 - - - -   % Wt Loss 0.86 - 0.87 - - - -   80% EBW 91.2 - 91.6 - - - -       Body mass index is 30.08 kg/m². Comorbidities:    Hypertension: resolved  Diabetes: resolved  Obstructive Sleep Apnea: resolved  Hyperlipidemia: not applicable  Stress Urinary Incontinence: not applicable  Gastroesophageal Reflux: not applicable  Weight related arthropathy:not applicable        Past Medical History:   Diagnosis Date    Adverse effect of anesthesia     woke up during cholecystectomy    Asthma     Diabetes ([facility] [address]     no meds    Hypertension     no meds    Sleep apnea     uses cpap        Past Surgical History:   Procedure Laterality Date    HX ABDOMINAL LAPAROSCOPY  1978    HX APPENDECTOMY      HX CHOLECYSTECTOMY      HX GI      gastric bypass 6/2016    HX HYSTERECTOMY      LAP GASTRIC BYPASS/NEAL-EN-Y  6-27-16    Dr. Hedy Mckenzie       Current Outpatient Medications   Medication Sig Dispense Refill    Biotin 2,500 mcg cap Take  by mouth.  ascorbic acid, vitamin C, (VITAMIN C) 500 mg tablet Take  by mouth.  vitamin e (E GEMS) 1,000 unit capsule Take 1,000 Units by mouth daily.       potassium 99 mg tablet Take 99 mg by mouth daily.  cyanocobalamin (VITAMIN B-12) 1,000 mcg sublingual tablet Take 1,000 mcg by mouth daily.  cholecalciferol, vitamin D3, 2,000 unit tab Take 5,000 Units by mouth.  pediatric multivitamins chewable tablet Take 1 Tab by mouth two (2) times a day.  calcium citrate 200 mg (950 mg) tablet Take 600 mg by mouth daily.  albuterol (PROVENTIL HFA, VENTOLIN HFA, PROAIR HFA) 90 mcg/actuation inhaler 1-2 Puffs. Allergies   Allergen Reactions    Codeine Itching     Migraine    Erythromycin Nausea and Vomiting    Percocet [Oxycodone-Acetaminophen] Hives     Denies at present. Thinks hives were from stress. ROS:  Review of Systems   Constitutional: Positive for weight loss. Intentional    HENT: Negative. Hair loss      Eyes: Negative. Respiratory: Negative. Gastrointestinal: Positive for abdominal pain, diarrhea and nausea. Negative for constipation. Abdominal pain, hot flashes, and nausea with dumping    Genitourinary: Negative. Musculoskeletal: Negative. Skin: Negative. Neurological: Negative. Psychiatric/Behavioral: Negative. Physicial Exam:  Visit Vitals  /84 (BP 1 Location: Left arm, BP Patient Position: Sitting)   Pulse 74   Temp 98.1 °F (36.7 °C) (Oral)   Resp 18   Ht 5' (1.524 m)   Wt 69.9 kg (154 lb)   BMI 30.08 kg/m²     Physical Exam   Constitutional: She is oriented to person, place, and time and well-developed, well-nourished, and in no distress. HENT:   Head: Normocephalic. Cardiovascular: Normal rate. Pulmonary/Chest: Effort normal.   Abdominal: Soft. She exhibits no distension. There is no tenderness. Musculoskeletal: Normal range of motion. Neurological: She is alert and oriented to person, place, and time. Gait normal.   Skin: Skin is warm and dry.    Psychiatric: Affect normal.       Labs: 7/11/18, reviewed , significant for      7/11/2018 10:43   Protein, total 5.3 (L)   Albumin 3.5 (L)     Sodium 145 (H)       Assessment/Plan: Pt is currently 2 years s/p laparoscopic gastric bypass surgery with a total weight loss of 97lbs to date, doing well with some infrequent dietary indiscretions . Labs were reviewed and pt was instructed to cont all supplements. Pt's Protein and Albumin low/trending down, increase pro intake, discussed way to do this. Lengthy disc re: CHO metabolism after GBP and risk of dangerous reactive hypoglycemia--urged pt to avoid CHO foods. Food list given. Stressed importance of hydration with SF clear liquids until urine clear. Encouraged support group attendance  Advised 150mins/ week of physical activity. Follow up with RD 1 mo and PRN. Follow up NP 3 mo with labs prior and PRN.      Counseling>50% of 30 minute visit  Artie Yip NP

## 2018-10-16 NOTE — PROGRESS NOTES
Chief Complaint   Patient presents with    Weight Management      follow up GB 6/27/2016 states protien was low and got labs redrawn. 1. Have you been to the ER, urgent care clinic since your last visit? Hospitalized since your last visit? No    2. Have you seen or consulted any other health care providers outside of the [facility] since your last visit? Include any pap smears or colon screening. No   Pt ID confirmed    Weight Loss Metrics 10/16/2018 7/25/2018 1/15/2018 6/29/2017 12/29/2016 9/26/2016 7/14/2016   Pre op / Initial Wt 234 - - - - - -   Today's Wt 154 lb 155 lb 153 lb 12.8 oz 156 lb 180 lb 195 lb 220 lb   BMI 30.08 kg/m2 28.35 kg/m2 28.13 kg/m2 28.53 kg/m2 32.92 kg/m2 35.67 kg/m2 40.23 kg/m2   Ideal Body Wt 119.5 - - - - - -   Excess Body Wt 114.5 - - - - - -   Goal Wt 143 - - - - - -   Wt loss to date 80 - - - - - -   % Wt Loss 0.87 - - - - - -   80% EBW 91.6 - - - - - -       Body mass index is 30.08 kg/(m^2).     Post op medications:  MVI: Flitstones ont tab twice a day  Calcium Citrate: 1500 milligrams  Actigal NA  B-12 1000 micrograms  Vit D 3 5000 units

## 2018-10-16 NOTE — MR AVS SNAPSHOT
[address], [place] [address] [facility] 
[phone] Patient: Hood Michelle MRN: [id number] MNF:[id number] Visit Information Date & Time Provider Department Dept. Phone Encounter #  
 10/16/2018  3:30 PM Andreina Prado NP [facility] [place] [phone] [id number] Your Appointments 11/20/2018  1:00 PM  
Follow Up with Andreina Prado NP [facility] [place] ([facility] Appt Note: 1 month f/up with labs 2300 [facility] [address] [id number] [address]51 [facility] 200 [facility] Upcoming Health Maintenance Date Due Hepatitis C Screening 1960 FOOT EXAM Q1 6/17/1968 EYE EXAM RETINAL OR DILATED Q1 6/17/1968 Pneumococcal 19-64 Medium Risk (1 of 1 - PPSV23) 6/17/1977 PAP AKA CERVICAL CYTOLOGY 6/17/1979 Shingrix Vaccine Age 50> (1 of 2) 6/17/2008 BREAST CANCER SCRN MAMMOGRAM 6/17/2008 FOBT Q 1 YEAR AGE 50-75 6/17/2008 LIPID PANEL Q1 6/3/2011 HEMOGLOBIN A1C Q6M 2/15/2017 MICROALBUMIN Q1 8/16/2017 Influenza Age 5 to Adult 8/1/2018 DTaP/Tdap/Td series (3 - Td) 5/17/2024 Allergies as of 10/16/2018  Review Complete On: 10/16/2018 By: Christy Barraza Severity Noted Reaction Type Reactions Codeine  11/17/2015    Itching Migraine Erythromycin  11/17/2015    Nausea and Vomiting Percocet [Oxycodone-acetaminophen]  12/17/2015    Hives Denies at present. Thinks hives were from stress. Current Immunizations  Never Reviewed No immunizations on file. Not reviewed this visit You Were Diagnosed With   
  
 Codes Comments Postoperative malabsorption    -  Primary ICD-10-CM: K91.2 ICD-9-CM: 579.3 Gastric bypass status for obesity     ICD-10-CM: Z98.84 ICD-9-CM: V45.86 Postsurgical dumping syndrome     ICD-10-CM: K91.1 ICD-9-CM: 36.3 Abdominal pain, unspecified abdominal location     ICD-10-CM: R10.9 ICD-9-CM: 789.00 Vitals BP Pulse Temp Resp Height(growth percentile) Weight(growth percentile) 134/84 (BP 1 Location: Left arm, BP Patient Position: Sitting) 74 98.1 °F (36.7 °C) (Oral) 18 5' (1.524 m) 154 lb (69.9 kg) BMI OB Status Smoking Status 30.08 kg/m2 Hysterectomy Never Smoker Vitals History BMI and BSA Data Body Mass Index Body Surface Area 30.08 kg/m 2 1.72 m 2 Preferred Pharmacy Pharmacy Name Phone [address] [place] [address]. [phone] Your Updated Medication List  
  
   
This list is accurate as of 10/16/18  4:11 PM.  Always use your most recent med list.  
  
  
  
  
 Biotin 2,500 mcg Cap Take  by mouth.  
  
 calcium citrate 200 mg (950 mg) tablet Take 500 mg by mouth daily. cholecalciferol (vitamin D3) 2,000 unit Tab Take  by mouth.  
  
 pediatric multivitamins chewable tablet Take 1 Tab by mouth daily. potassium 99 mg tablet Take 99 mg by mouth daily. VITAMIN B-12 1,000 mcg sublingual tablet Generic drug:  cyanocobalamin Take 1,000 mcg by mouth daily. VITAMIN C 500 mg tablet Generic drug:  ascorbic acid (vitamin C) Take  by mouth.  
  
 vitamin e 1,000 unit capsule Commonly known as:  E GEMS Take 1,000 Units by mouth daily. Introducing [facility]! [facility] introduces Vyyo patient portal. Now you can access parts of your medical record, email your doctor's office, and request medication refills online. 1. In your internet browser, go to [email or web address] 2. Click on the First Time User? Click Here link in the Sign In box. You will see the New Member Sign Up page. 3. Enter your Vyyo Access Code exactly as it appears below. You will not need to use this code after youve completed the sign-up process.  If you do not sign up before the expiration date, you must request a new code. · HitMeUp Access Code: [email or web address] Expires: 10/23/2018  9:46 AM 
 
4. Enter the last four digits of your Social Security Number (xxxx) and Date of Birth (mm/dd/yyyy) as indicated and click Submit. You will be taken to the next sign-up page. 5. Create a HitMeUp ID. This will be your HitMeUp login ID and cannot be changed, so think of one that is secure and easy to remember. 6. Create a HitMeUp password. You can change your password at any time. 7. Enter your Password Reset Question and Answer. This can be used at a later time if you forget your password. 8. Enter your e-mail address. You will receive e-mail notification when new information is available in [address] 9. Click Sign Up. You can now view and download portions of your medical record. 10. Click the Download Summary menu link to download a portable copy of your medical information. If you have questions, please visit the Frequently Asked Questions section of the HitMeUp website. Remember, HitMeUp is NOT to be used for urgent needs. For medical emergencies, dial 911. Now available from your iPhone and Android! Please provide this summary of care documentation to your next provider. Your primary care clinician is listed as Hal Younger. If you have any questions after today's visit, please call [phone].

## 2018-11-08 ENCOUNTER — HOSPITAL ENCOUNTER (OUTPATIENT)
Dept: LAB | Age: 58
Discharge: HOME | End: 2018-11-08

## 2018-11-08 LAB — XX-LABCORP SPECIMEN COL,LCBCF: NORMAL

## 2018-11-08 PROCEDURE — 99001 SPECIMEN HANDLING PT-LAB: CPT

## 2018-11-09 LAB
ALBUMIN SERPL-MCNC: 4 G/DL (ref 3.6–4.8)
PREALB SERPL-MCNC: 16 MG/DL (ref 10–36)

## 2018-11-20 ENCOUNTER — HOSPITAL ENCOUNTER (OUTPATIENT)
Dept: INFUSION THERAPY | Age: 58
Discharge: HOME | End: 2018-11-20
Payer: COMMERCIAL

## 2018-11-20 ENCOUNTER — OFFICE VISIT (OUTPATIENT)
Dept: SURGERY | Age: 58
End: 2018-11-20

## 2018-11-20 VITALS
RESPIRATION RATE: 18 BRPM | WEIGHT: 156 LBS | BODY MASS INDEX: 30.63 KG/M2 | OXYGEN SATURATION: 99 % | HEIGHT: 60 IN | SYSTOLIC BLOOD PRESSURE: 125 MMHG | HEART RATE: 71 BPM | TEMPERATURE: 98.3 F | DIASTOLIC BLOOD PRESSURE: 84 MMHG

## 2018-11-20 VITALS
HEART RATE: 80 BPM | RESPIRATION RATE: 18 BRPM | DIASTOLIC BLOOD PRESSURE: 83 MMHG | TEMPERATURE: 98.3 F | OXYGEN SATURATION: 98 % | SYSTOLIC BLOOD PRESSURE: 123 MMHG

## 2018-11-20 DIAGNOSIS — G47.33 OBSTRUCTIVE SLEEP APNEA SYNDROME: ICD-10-CM

## 2018-11-20 DIAGNOSIS — Z98.84 GASTRIC BYPASS STATUS FOR OBESITY: ICD-10-CM

## 2018-11-20 DIAGNOSIS — E66.9 OBESITY (BMI 30-39.9): Primary | ICD-10-CM

## 2018-11-20 DIAGNOSIS — K91.2 POSTOPERATIVE MALABSORPTION: ICD-10-CM

## 2018-11-20 DIAGNOSIS — R53.82 CHRONIC FATIGUE: ICD-10-CM

## 2018-11-20 DIAGNOSIS — E86.0 DEHYDRATION: ICD-10-CM

## 2018-11-20 PROCEDURE — 96361 HYDRATE IV INFUSION ADD-ON: CPT

## 2018-11-20 PROCEDURE — 74011000250 HC RX REV CODE- 250: Performed by: NURSE PRACTITIONER

## 2018-11-20 PROCEDURE — 74011250636 HC RX REV CODE- 250/636: Performed by: NURSE PRACTITIONER

## 2018-11-20 PROCEDURE — 96360 HYDRATION IV INFUSION INIT: CPT

## 2018-11-20 RX ORDER — ONDANSETRON 2 MG/ML
4 INJECTION INTRAMUSCULAR; INTRAVENOUS
Status: ACTIVE | OUTPATIENT
Start: 2018-11-20 | End: 2018-11-21

## 2018-11-20 RX ORDER — SODIUM CHLORIDE 9 MG/ML
1000 INJECTION, SOLUTION INTRAVENOUS ONCE
Status: COMPLETED | OUTPATIENT
Start: 2018-11-20 | End: 2018-11-20

## 2018-11-20 RX ORDER — ALBUTEROL SULFATE 90 UG/1
1-2 AEROSOL, METERED RESPIRATORY (INHALATION)
COMMUNITY
Start: 2018-04-05

## 2018-11-20 RX ORDER — SODIUM CHLORIDE 0.9 % (FLUSH) 0.9 %
10-40 SYRINGE (ML) INJECTION AS NEEDED
Status: DISCONTINUED | OUTPATIENT
Start: 2018-11-20 | End: 2018-11-24 | Stop reason: HOSPADM

## 2018-11-20 RX ADMIN — FOLIC ACID: 5 INJECTION, SOLUTION INTRAMUSCULAR; INTRAVENOUS; SUBCUTANEOUS at 14:28

## 2018-11-20 RX ADMIN — SODIUM CHLORIDE 1000 ML/HR: 900 INJECTION, SOLUTION INTRAVENOUS at 14:19

## 2018-11-20 RX ADMIN — Medication 10 ML: at 14:19

## 2018-11-20 NOTE — PROGRESS NOTES
[facility] Progress Note Date: 2018 Name: VIRAJ SALAS MRN: [id number] : 1960 Hydration Ms. Gibson to [facility], at 9803. Pt was assessed and education was provided. Ms. Gibson's vitals were reviewed and patient was observed for 5 minutes prior to treatment. Visit Vitals /83 (BP 1 Location: Right arm, BP Patient Position: Sitting) Pulse 80 Temp 98.3 °F (36.8 °C) Resp 18 SpO2 98% Breastfeeding? No  
 
 
 
22 g PIV placed in the right AC x 1 attempt. PIV flushed easily and had brisk blood return. 1L NS and 1 Banana Bag infused concurrently over 2 hours as ordered. Ms. Nicolette Pope tolerated the infusion, and had no complaints. VS remained stable. Patient Vitals for the past 12 hrs: 
 Temp Pulse Resp BP SpO2  
18 1635    123/83   
18 1409 98.3 °F (36.8 °C) 80 18 (!) 150/93 98 % PIV flushed with NS 10 ml and removed. No bleeding or hematoma noted at site. Guaze and coban applied. Patient armband removed and shredded. Ms. Nicolette Pope was discharged from [place] [address] in stable condition at 1640. She has no further appointments with [facility] at this time. Nehemiah Cartwright RN 2018

## 2018-11-20 NOTE — PROGRESS NOTES
Pt ID confirmed    Weight Loss Metrics 11/20/2018 11/20/2018 10/16/2018 10/16/2018 7/25/2018 1/15/2018 6/29/2017   Pre op / Initial Wt 234 - 234 - - - -   Today's Wt - 156 lb - 154 lb 155 lb 153 lb 12.8 oz 156 lb   BMI - 30.47 kg/m2 - 30.08 kg/m2 28.35 kg/m2 28.13 kg/m2 28.53 kg/m2   Ideal Body Wt 120 - 119.5 - - - -   Excess Body Wt 114 - 114.5 - - - -   Goal Wt 142 - 143 - - - -   Wt loss to date 78 - 80 - - - -   % Wt Loss 0.86 - 0.87 - - - -   80% EBW 91.2 - 91.6 - - - -       Body mass index is 30.47 kg/m².     Post op medications:  MVI: yes daily will increase   Calcium Citrate: 500 milligrams  Actigal 0  B-12 1000 micrograms  Vit D 3 5000 units

## 2018-11-20 NOTE — PROGRESS NOTES
Virgil Washington is a 59 y.o. female is now 2 years status post laparoscopic gastric bypass surgery. Doing well overall. Currently on a stage 4 meats/veggies diet. She notes improvement in dumping/hot flashs/ nausea with dec CHO intake. Taking in 20-30 oz water,  60+g protein. 60min of activity 2 days a week. Bowel movements are alternating constipation and regularity. The patient is not having any pain. . She is compliant with multivitamins, calcium, Vit D and B12 supplements. She has been working to improve protein and overall nutrition. C/o fatigue and poor sleep. Working 3pm-11pm shift only sleep about 4 hours each night. H/o KINGSTON not longer uses CPAP. Stopped using fruit smoothies. Weight Loss Metrics 11/20/2018 11/20/2018 10/16/2018 10/16/2018 7/25/2018 1/15/2018 6/29/2017   Pre op / Initial Wt 234 - 234 - - - -   Today's Wt - 156 lb - 154 lb 155 lb 153 lb 12.8 oz 156 lb   BMI - 30.47 kg/m2 - 30.08 kg/m2 28.35 kg/m2 28.13 kg/m2 28.53 kg/m2   Ideal Body Wt 120 - 119.5 - - - -   Excess Body Wt 114 - 114.5 - - - -   Goal Wt 142 - 143 - - - -   Wt loss to date 78 - 80 - - - -   % Wt Loss 0.86 - 0.87 - - - -   80% EBW 91.2 - 91.6 - - - -       Body mass index is 30.47 kg/m².       Comorbidities:    Hypertension: resolved  Diabetes: resolved  Obstructive Sleep Apnea: resolved  Hyperlipidemia: not applicable  Stress Urinary Incontinence: not applicable  Gastroesophageal Reflux: not applicable  Weight related arthropathy:not applicable        Past Medical History:   Diagnosis Date    Adverse effect of anesthesia     woke up during cholecystectomy    Asthma     Diabetes ([facility] [address]     no meds    Hypertension     no meds    Sleep apnea     uses cpap        Past Surgical History:   Procedure Laterality Date    HX ABDOMINAL LAPAROSCOPY  1978    HX APPENDECTOMY      HX CHOLECYSTECTOMY      HX GI      gastric bypass 6/2016    HX HYSTERECTOMY      LAP GASTRIC BYPASS/NEAL-EN-Y  6-27-16    Dr. Oliver Asp Current Outpatient Medications   Medication Sig Dispense Refill    albuterol (PROVENTIL HFA, VENTOLIN HFA, PROAIR HFA) 90 mcg/actuation inhaler 1-2 Puffs.  Biotin 2,500 mcg cap Take  by mouth.  ascorbic acid, vitamin C, (VITAMIN C) 500 mg tablet Take  by mouth.  vitamin e (E GEMS) 1,000 unit capsule Take 1,000 Units by mouth daily.  potassium 99 mg tablet Take 99 mg by mouth daily.  cyanocobalamin (VITAMIN B-12) 1,000 mcg sublingual tablet Take 1,000 mcg by mouth daily.  cholecalciferol, vitamin D3, 2,000 unit tab Take 5,000 Units by mouth.  pediatric multivitamins chewable tablet Take 1 Tab by mouth two (2) times a day.  calcium citrate 200 mg (950 mg) tablet Take 600 mg by mouth daily. Facility-Administered Medications Ordered in Other Visits   Medication Dose Route Frequency Provider Last Rate Last Dose    ondansetron (ZOFRAN) injection 4 mg  4 mg IntraVENous ONCE PRN Sonia Villa NP        sodium chloride (NS) flush 10-40 mL  10-40 mL IntraVENous PRN Danielle Adorno MD   10 mL at 11/20/18 1419       Allergies   Allergen Reactions    Codeine Itching     Migraine    Erythromycin Nausea and Vomiting    Percocet [Oxycodone-Acetaminophen] Hives     Denies at present. Thinks hives were from stress. Review of Systems   Constitutional: Positive for malaise/fatigue and weight loss. HENT:        Hair loss    Gastrointestinal: Positive for diarrhea. Neurological: Positive for dizziness, tingling and weakness. Psychiatric/Behavioral:        Sleep disturbance    All other systems reviewed and are negative. Physicial Exam:  Visit Vitals  /84   Pulse 71   Temp 98.3 °F (36.8 °C)   Resp 18   Ht 5' (1.524 m)   Wt 70.8 kg (156 lb)   SpO2 99%   BMI 30.47 kg/m²     Physical Exam   Constitutional: She is oriented to person, place, and time and well-developed, well-nourished, and in no distress. HENT:   Head: Normocephalic.    Cardiovascular: Normal rate. Pulmonary/Chest: Effort normal.   Abdominal: Soft. She exhibits no distension. There is no tenderness. Musculoskeletal: Normal range of motion. Neurological: She is alert and oriented to person, place, and time. Skin: Skin is warm and dry. Psychiatric: Affect normal.   Nursing note and vitals reviewed. Labs:   Prealbumin 16       Ref. Range 2018 00:00   Albumin Latest Ref Range: 3.6 - 4.8 g/dL 4.0         Assessment/Plan: Pt is currently 2 years s/p laparoscopic gastric bypass surgery with a total weight loss of 78 lbs to date, doing well with some infrequent dietary indiscretions. Overall s/s of reactive hypoglycemia improved. Pt's Protein and Albumin improving. Lengthy disc re: CHO metabolism after GBP and risk of dangerous reactive hypoglycemia--urged pt to avoid CHO foods. Stressed importance of hydration with SF clear liquids until urine clear. 20-30oz daily is not enough and is leading to dehydration. Encouraged support group attendance  Advised 150mins/ week of physical activity. Recommend follow up with sleep doctor to eval for KINGSTON and sleep disturbance. R/o thyroid as cause of fatigue   Send for fluids at out pt infusion and Banana bag -- this will cover B1 replacement at well- possible signs of B1 def with tingling, lack of hunger, and dizzy. Orders Placed This Encounter    TSH AND FREE T4     Standing Status:   Future     Standing Expiration Date:   2019    albuterol (PROVENTIL HFA, VENTOLIN HFA, PROAIR HFA) 90 mcg/actuation inhaler     Si-2 Puffs.        Follow up 3 mo and PRN     Counseling>50% of 30 minute visit  Jasvir Carmona NP

## 2019-02-15 ENCOUNTER — HOSPITAL ENCOUNTER (OUTPATIENT)
Dept: LAB | Age: 59
Discharge: HOME | End: 2019-02-15
Payer: OTHER GOVERNMENT

## 2019-02-15 DIAGNOSIS — R53.82 CHRONIC FATIGUE: ICD-10-CM

## 2019-02-15 DIAGNOSIS — Z98.84 GASTRIC BYPASS STATUS FOR OBESITY: ICD-10-CM

## 2019-02-15 DIAGNOSIS — E66.9 OBESITY (BMI 30-39.9): ICD-10-CM

## 2019-02-15 DIAGNOSIS — E86.0 DEHYDRATION: ICD-10-CM

## 2019-02-15 DIAGNOSIS — K91.2 POSTOPERATIVE MALABSORPTION: ICD-10-CM

## 2019-02-15 DIAGNOSIS — G47.33 OBSTRUCTIVE SLEEP APNEA SYNDROME: ICD-10-CM

## 2019-02-15 LAB
T4 FREE SERPL-MCNC: 1 NG/DL (ref 0.7–1.5)
TSH SERPL DL<=0.05 MIU/L-ACNC: 1.45 UIU/ML (ref 0.36–3.74)

## 2019-02-15 PROCEDURE — 84439 ASSAY OF FREE THYROXINE: CPT

## 2019-02-15 PROCEDURE — 36415 COLL VENOUS BLD VENIPUNCTURE: CPT

## 2019-02-19 ENCOUNTER — OFFICE VISIT (OUTPATIENT)
Dept: SURGERY | Age: 59
End: 2019-02-19

## 2019-02-19 VITALS
SYSTOLIC BLOOD PRESSURE: 124 MMHG | BODY MASS INDEX: 30.78 KG/M2 | WEIGHT: 156.8 LBS | HEART RATE: 74 BPM | DIASTOLIC BLOOD PRESSURE: 86 MMHG | TEMPERATURE: 98.6 F | HEIGHT: 60 IN | RESPIRATION RATE: 18 BRPM

## 2019-02-19 DIAGNOSIS — E66.9 OBESITY (BMI 30-39.9): Primary | ICD-10-CM

## 2019-02-19 DIAGNOSIS — Z98.84 GASTRIC BYPASS STATUS FOR OBESITY: ICD-10-CM

## 2019-02-19 DIAGNOSIS — K91.2 POSTOPERATIVE MALABSORPTION: ICD-10-CM

## 2019-02-19 DIAGNOSIS — R53.82 CHRONIC FATIGUE: ICD-10-CM

## 2019-02-19 NOTE — PROGRESS NOTES
Aldo Cantor is a 59 y.o. female is now over 2 years status post laparoscopic gastric bypass surgery. Doing well overall. Currently on a stage 4 diet without difficulty. Occasional dumping but less frequent now. Taking in 50-60 oz water,  60 g protein. 30-60 min of activity 4 days a week. Bowel movements are regular/loose. The patient is not having any pain. . She is compliant with multivitamins, calcium, Vit D and B12 supplements. Is only taking Monster 650mg and MVI once daily. Craving nuts and nut butters, measuring servings, using as snacks. Following up today on labs and improving s/s present at last visit. Weight Loss Metrics 2/19/2019 2/19/2019 11/20/2018 11/20/2018 10/16/2018 10/16/2018 7/25/2018   Pre op / Initial Wt 234 - 234 - 234 - -   Today's Wt - 156 lb 12.8 oz - 156 lb - 154 lb 155 lb   BMI - 30.62 kg/m2 - 30.47 kg/m2 - 30.08 kg/m2 28.35 kg/m2   Ideal Body Wt 119.5 - 120 - 119.5 - -   Excess Body Wt 114.5 - 114 - 114.5 - -   Goal Wt 143 - 142 - 143 - -   Wt loss to date 77.2 - 78 - 80 - -   % Wt Loss 0.84 - 0.86 - 0.87 - -   80% EBW 91.6 - 91.2 - 91.6 - -       Body mass index is 30.62 kg/m². Past Medical History:   Diagnosis Date    Adverse effect of anesthesia     woke up during cholecystectomy    Asthma     Diabetes ([facility] [address]     no meds    Hypertension     no meds    Sleep apnea     uses cpap        Past Surgical History:   Procedure Laterality Date    HX ABDOMINAL LAPAROSCOPY  1978    HX APPENDECTOMY      HX CHOLECYSTECTOMY      HX GI      gastric bypass 6/2016    HX HYSTERECTOMY      LAP GASTRIC BYPASS/NEAL-EN-Y  6-27-16    Dr. [facility]       Current Outpatient Medications   Medication Sig Dispense Refill    albuterol (PROVENTIL HFA, VENTOLIN HFA, PROAIR HFA) 90 mcg/actuation inhaler 1-2 Puffs.  Biotin 2,500 mcg cap Take  by mouth.  ascorbic acid, vitamin C, (VITAMIN C) 500 mg tablet Take  by mouth.       vitamin e (E GEMS) 1,000 unit capsule Take 1,000 Units by mouth daily.  potassium 99 mg tablet Take 99 mg by mouth daily.  cyanocobalamin (VITAMIN B-12) 1,000 mcg sublingual tablet Take 1,000 mcg by mouth daily.  cholecalciferol, vitamin D3, 2,000 unit tab Take 5,000 Units by mouth.  pediatric multivitamins chewable tablet Take 1 Tab by mouth two (2) times a day.  calcium citrate 200 mg (950 mg) tablet Take 600 mg by mouth daily. Allergies   Allergen Reactions    Codeine Itching     Migraine    Erythromycin Nausea and Vomiting    Percocet [Oxycodone-Acetaminophen] Hives     Denies at present. Thinks hives were from stress. ROS:  Review of Systems   Constitutional: Positive for malaise/fatigue. HENT:        Hair loss    Gastrointestinal: Positive for abdominal pain and diarrhea. Negative for constipation. Dumping    Neurological: Positive for dizziness. All other systems reviewed and are negative. Physicial Exam:  Visit Vitals  /86 (BP 1 Location: Left arm, BP Patient Position: Sitting)   Pulse 74   Temp 98.6 °F (37 °C) (Oral)   Resp 18   Ht 5' (1.524 m)   Wt 71.1 kg (156 lb 12.8 oz)   BMI 30.62 kg/m²     Physical Exam   Constitutional: She is oriented to person, place, and time and well-developed, well-nourished, and in no distress. HENT:   Head: Normocephalic. Cardiovascular: Normal rate. Pulmonary/Chest: Effort normal.   Abdominal: Soft. She exhibits no distension. There is no tenderness. Musculoskeletal: Normal range of motion. Neurological: She is alert and oriented to person, place, and time. Skin: Skin is warm and dry. Psychiatric: Affect normal.   Nursing note and vitals reviewed. Labs:   Ref.  Range 7/11/2018 10:43 10/8/2018 09:58 11/8/2018 00:00 11/8/2018 10:32   WBC Latest Ref Range: 4.6 - 13.2 K/uL 6.2 6.2     RBC Latest Ref Range: 4.20 - 5.30 M/uL 4.69 4.26     HGB Latest Ref Range: 12.0 - 16.0 g/dL 13.7 12.9     HCT Latest Ref Range: 35.0 - 45.0 % 42.3 38.7 MCV Latest Ref Range: 74.0 - 97.0 FL 90 90.8     MCH Latest Ref Range: 24.0 - 34.0 PG 29.2 30.3     MCHC Latest Ref Range: 31.0 - 37.0 g/dL 32.4 33.3     RDW Latest Ref Range: 11.6 - 14.5 % 15.6 (H) 13.4     PLATELET Latest Ref Range: 135 - 420 K/uL 283 317     MPV Latest Ref Range: 9.2 - 11.8 FL  10.2     NEUTROPHILS Latest Ref Range: 40 - 73 % 46 58     LYMPHOCYTES Latest Ref Range: 21 - 52 %  30     Lymphocytes Latest Ref Range: Not Estab. % 39      MONOCYTES Latest Ref Range: 3 - 10 % 7 6     EOSINOPHILS Latest Ref Range: 0 - 5 % 8 6 (H)     BASOPHILS Latest Ref Range: 0 - 2 % 0 0     IMMATURE GRANULOCYTES Latest Ref Range: Not Estab. % 0      DF Latest Units:    AUTOMATED     ABS. NEUTROPHILS Latest Ref Range: 1.8 - 8.0 K/UL 2.9 3.6     ABS. IMM. GRANS. Latest Ref Range: 0.0 - 0.1 x10E3/uL 0.0      ABS. LYMPHOCYTES Latest Ref Range: 0.9 - 3.6 K/UL  1.8     Abs Lymphocytes Latest Ref Range: 0.7 - 3.1 x10E3/uL 2.4      ABS. MONOCYTES Latest Ref Range: 0.05 - 1.2 K/UL 0.4 0.4     ABS. EOSINOPHILS Latest Ref Range: 0.0 - 0.4 K/UL 0.5 (H) 0.4     ABS.  BASOPHILS Latest Ref Range: 0.0 - 0.1 K/UL 0.0 0.0     Sodium Latest Ref Range: 136 - 145 mmol/L 145 (H) 145     Potassium Latest Ref Range: 3.5 - 5.5 mmol/L 4.6 4.8     Chloride Latest Ref Range: 100 - 108 mmol/L 105 110 (H)     CO2 Latest Ref Range: 21 - 32 mmol/L 27 29     Anion gap Latest Ref Range: 3.0 - 18 mmol/L  6     Glucose Latest Ref Range: 74 - 99 mg/dL 101 (H) 93     BUN Latest Ref Range: 7.0 - 18 MG/DL 16 18     Creatinine Latest Ref Range: 0.6 - 1.3 MG/DL 0.94 0.83     BUN/Creatinine ratio Latest Ref Range: 12 - 20   17 22 (H)     Calcium Latest Ref Range: 8.5 - 10.1 MG/DL 9.8 9.1     GFR est non-AA Latest Ref Range: >60 ml/min/1.73m2 66 >60     GFR est AA Latest Ref Range: >60 ml/min/1.73m2 76 >60     Bilirubin, total Latest Ref Range: 0.0 - 1.2 mg/dL 0.4      Protein, total Latest Ref Range: 6.0 - 8.5 g/dL 5.3 (L)      Albumin Latest Ref Range: 3.6 - 4.8 g/dL 3.5 (L)  4.0    A-G Ratio Latest Ref Range: 1.2 - 2.2  1.9      ALT (SGPT) Latest Ref Range: 0 - 32 IU/L 39 (H)      AST Latest Ref Range: 0 - 40 IU/L 36      Alk. phosphatase Latest Ref Range: 39 - 117 IU/L 76      Vitamin B12 Latest Ref Range: 211 - 911 pg/mL >2000 (H) >2,000 (H)     Folate Latest Ref Range: 3.10 - 17.50 ng/mL >20.0 >20.0 (H)     Iron Latest Ref Range: 50 - 175 ug/dL 224 (H) 191 (H)     Ferritin Latest Ref Range: 8 - 388 NG/ML 20 14     VITAMIN B1, WHOLE BLOOD Unknown Rpt Rpt     VITAMIN D, 25 HYROXY PANEL Unknown  Rpt     VITAMIN D, 25-HYDROXY Latest Ref Range: 30.0 - 100.0 ng/mL 32.0      VITAMIN D, 25 HYDROXY Unknown Rpt      [facility] SPECIMEN COL Unknown    Rpt   [facility] SPECIMEN COLLN. Latest Units:      Specimens collect. .. Assessment/Plan: Pt is currently 2 years  s/p laparoscopic gastric bypass surgery with a total weight loss of 77 lbs to date, improved. Labs reviewed and alb/pro improved. Stressed importance of hydration with SF clear liquids until urine clear. Continue  with food content mainly protein/meats/veggies. Encouraged support group attendance. Advised exercise program of continue current. Advised of current supplement protocol. Follow up yearly with labs prior.       >50% of 20 min visit spent counseling     ELODIA Daniels-BC

## 2019-02-19 NOTE — PROGRESS NOTES
Chief Complaint   Patient presents with    Follow-up     GBP 6/27/2016     Pt ID confirmed    Weight Loss Metrics 2/19/2019 2/19/2019 11/20/2018 11/20/2018 10/16/2018 10/16/2018 7/25/2018   Pre op / Initial Wt 234 - 234 - 234 - -   Today's Wt - 156 lb 12.8 oz - 156 lb - 154 lb 155 lb   BMI - 30.62 kg/m2 - 30.47 kg/m2 - 30.08 kg/m2 28.35 kg/m2   Ideal Body Wt 119.5 - 120 - 119.5 - -   Excess Body Wt 114.5 - 114 - 114.5 - -   Goal Wt 143 - 142 - 143 - -   Wt loss to date 77.2 - 78 - 80 - -   % Wt Loss 0.84 - 0.86 - 0.87 - -   80% EBW 91.6 - 91.2 - 91.6 - -       Body mass index is 30.62 kg/m².     Post op medications:  MVI: Flintstones once daily   Calcium Citrate: 650 milligrams  Actigal N/A  B-12 1000 micrograms  Vit D 3 5000 units

## 2019-04-03 NOTE — PROGRESS NOTES
Contacted patient and verified identity using name and date of birth. Informed pt that TSH and Free T4 was within normal limits. Pt verbalized understanding.

## 2019-10-24 ENCOUNTER — HOSPITAL ENCOUNTER (OUTPATIENT)
Dept: LAB | Age: 59
Discharge: HOME | End: 2019-10-24
Payer: COMMERCIAL

## 2019-10-24 LAB
25(OH)D3 SERPL-MCNC: 39 NG/ML (ref 30–100)
ALBUMIN SERPL-MCNC: 3.6 G/DL (ref 3.4–5)
ALBUMIN/GLOB SERPL: 1.2 {RATIO} (ref 0.8–1.7)
ALP SERPL-CCNC: 87 U/L (ref 45–117)
ALT SERPL-CCNC: 41 U/L (ref 13–56)
ANION GAP SERPL CALC-SCNC: 4 MMOL/L (ref 3–18)
AST SERPL-CCNC: 22 U/L (ref 10–38)
BASOPHILS # BLD: 0 K/UL (ref 0–0.1)
BASOPHILS NFR BLD: 0 % (ref 0–2)
BILIRUB SERPL-MCNC: 0.5 MG/DL (ref 0.2–1)
BUN SERPL-MCNC: 24 MG/DL (ref 7–18)
BUN/CREAT SERPL: 29 (ref 12–20)
CALCIUM SERPL-MCNC: 9.2 MG/DL (ref 8.5–10.1)
CHLORIDE SERPL-SCNC: 110 MMOL/L (ref 100–111)
CHOLEST SERPL-MCNC: 193 MG/DL
CO2 SERPL-SCNC: 30 MMOL/L (ref 21–32)
CREAT SERPL-MCNC: 0.84 MG/DL (ref 0.6–1.3)
DIFFERENTIAL METHOD BLD: NORMAL
EOSINOPHIL # BLD: 0.4 K/UL (ref 0–0.4)
EOSINOPHIL NFR BLD: 5 % (ref 0–5)
ERYTHROCYTE [DISTWIDTH] IN BLOOD BY AUTOMATED COUNT: 13.5 % (ref 11.6–14.5)
FERRITIN SERPL-MCNC: 33 NG/ML (ref 8–388)
FOLATE SERPL-MCNC: >20 NG/ML (ref 3.1–17.5)
GLOBULIN SER CALC-MCNC: 3 G/DL (ref 2–4)
GLUCOSE SERPL-MCNC: 87 MG/DL (ref 74–99)
HCT VFR BLD AUTO: 40 % (ref 35–45)
HDLC SERPL-MCNC: 107 MG/DL (ref 40–60)
HDLC SERPL: 1.8 {RATIO} (ref 0–5)
HGB BLD-MCNC: 13.3 G/DL (ref 12–16)
IRON SATN MFR SERPL: 53 %
IRON SERPL-MCNC: 173 UG/DL (ref 50–175)
LDLC SERPL CALC-MCNC: 79.8 MG/DL (ref 0–100)
LIPID PROFILE,FLP: ABNORMAL
LYMPHOCYTES # BLD: 1.8 K/UL (ref 0.9–3.6)
LYMPHOCYTES NFR BLD: 27 % (ref 21–52)
MAGNESIUM SERPL-MCNC: 1.9 MG/DL (ref 1.6–2.6)
MCH RBC QN AUTO: 30.3 PG (ref 24–34)
MCHC RBC AUTO-ENTMCNC: 33.3 G/DL (ref 31–37)
MCV RBC AUTO: 91.1 FL (ref 74–97)
MONOCYTES # BLD: 0.5 K/UL (ref 0.05–1.2)
MONOCYTES NFR BLD: 7 % (ref 3–10)
NEUTS SEG # BLD: 4 K/UL (ref 1.8–8)
NEUTS SEG NFR BLD: 61 % (ref 40–73)
PLATELET # BLD AUTO: 302 K/UL (ref 135–420)
PMV BLD AUTO: 10.3 FL (ref 9.2–11.8)
POTASSIUM SERPL-SCNC: 4.1 MMOL/L (ref 3.5–5.5)
PROT SERPL-MCNC: 6.6 G/DL (ref 6.4–8.2)
RBC # BLD AUTO: 4.39 M/UL (ref 4.2–5.3)
SODIUM SERPL-SCNC: 144 MMOL/L (ref 136–145)
TIBC SERPL-MCNC: 329 UG/DL (ref 250–450)
TRIGL SERPL-MCNC: 31 MG/DL (ref ?–150)
VIT B12 SERPL-MCNC: >2000 PG/ML (ref 211–911)
VLDLC SERPL CALC-MCNC: 6.2 MG/DL
WBC # BLD AUTO: 6.6 K/UL (ref 4.6–13.2)

## 2019-10-24 PROCEDURE — 84630 ASSAY OF ZINC: CPT

## 2019-10-24 PROCEDURE — 82306 VITAMIN D 25 HYDROXY: CPT

## 2019-10-24 PROCEDURE — 85025 COMPLETE CBC W/AUTO DIFF WBC: CPT

## 2019-10-24 PROCEDURE — 83735 ASSAY OF MAGNESIUM: CPT

## 2019-10-24 PROCEDURE — 80061 LIPID PANEL: CPT

## 2019-10-24 PROCEDURE — 83540 ASSAY OF IRON: CPT

## 2019-10-24 PROCEDURE — 82607 VITAMIN B-12: CPT

## 2019-10-24 PROCEDURE — 84425 ASSAY OF VITAMIN B-1: CPT

## 2019-10-24 PROCEDURE — 80053 COMPREHEN METABOLIC PANEL: CPT

## 2019-10-24 PROCEDURE — 36415 COLL VENOUS BLD VENIPUNCTURE: CPT

## 2019-10-24 PROCEDURE — 82728 ASSAY OF FERRITIN: CPT

## 2019-10-26 LAB
VIT B1 BLD-SCNC: 131 NMOL/L (ref 66.5–200)
ZINC SERPL-MCNC: 87 UG/DL (ref 56–134)

## 2019-11-12 ENCOUNTER — OFFICE VISIT (OUTPATIENT)
Dept: SURGERY | Age: 59
End: 2019-11-12

## 2019-11-12 VITALS
DIASTOLIC BLOOD PRESSURE: 84 MMHG | SYSTOLIC BLOOD PRESSURE: 128 MMHG | TEMPERATURE: 98.4 F | WEIGHT: 158 LBS | BODY MASS INDEX: 31.02 KG/M2 | HEIGHT: 60 IN

## 2019-11-12 DIAGNOSIS — K91.2 POSTOPERATIVE MALABSORPTION: ICD-10-CM

## 2019-11-12 DIAGNOSIS — E66.9 OBESITY (BMI 30-39.9): Primary | ICD-10-CM

## 2019-11-12 DIAGNOSIS — R53.82 CHRONIC FATIGUE: ICD-10-CM

## 2019-11-12 DIAGNOSIS — K91.1 POSTSURGICAL DUMPING SYNDROME: ICD-10-CM

## 2019-11-12 DIAGNOSIS — Z98.84 GASTRIC BYPASS STATUS FOR OBESITY: ICD-10-CM

## 2019-11-12 NOTE — PROGRESS NOTES
Chief Complaint   Patient presents with    Follow-up     GB 6/2016     Pt ID confirmed    Weight Loss Metrics 11/12/2019 11/12/2019 2/19/2019 2/19/2019 11/20/2018 11/20/2018 10/16/2018   Pre op / Initial Wt 234 - 234 - 234 - 234   Today's Wt - 158 lb - 156 lb 12.8 oz - 156 lb -   BMI - 30.86 kg/m2 - 30.62 kg/m2 - 30.47 kg/m2 -   Ideal Body Wt 120 - 119.5 - 120 - 119.5   Excess Body Wt 114 - 114.5 - 114 - 114.5   Goal Wt 143 - 143 - 142 - 143   Wt loss to date 76 - 77.2 - 78 - 80   % Wt Loss 0.83 - 0.84 - 0.86 - 0.87   80% EBW 91.2 - 91.6 - 91.2 - 91.6       Body mass index is 30.86 kg/m².     Post op medications:  MVI: only taking once a day  Calcium Citrate: only taking 600 milligrams   Actigal 0  B-12 1000 micrograms  Vit D 3 5000 units provided with information on recommended supplements

## 2019-11-12 NOTE — PROGRESS NOTES
Shyann Yeager is a 62 y.o. female is now over 2 years status post laparoscopic gastric bypass surgery. Doing well overall. Currently on a stage 4 diet without difficulty. Taking in 50-60 oz water,  60 g protein. 30-60 min of activity 4 days a week. Bowel movements are regular/loose. The patient is not having any pain. . She is compliant with multivitamins, calcium, Vit D and B12 supplements. Renovating house, treadmill, walking. Weight Loss Metrics 11/12/2019 11/12/2019 2/19/2019 2/19/2019 11/20/2018 11/20/2018 10/16/2018   Pre op / Initial Wt 234 - 234 - 234 - 234   Today's Wt - 158 lb - 156 lb 12.8 oz - 156 lb -   BMI - 30.86 kg/m2 - 30.62 kg/m2 - 30.47 kg/m2 -   Ideal Body Wt 120 - 119.5 - 120 - 119.5   Excess Body Wt 114 - 114.5 - 114 - 114.5   Goal Wt 143 - 143 - 142 - 143   Wt loss to date 76 - 77.2 - 78 - 80   % Wt Loss 0.83 - 0.84 - 0.86 - 0.87   80% EBW 91.2 - 91.6 - 91.2 - 91.6       Body mass index is 30.86 kg/m². Past Medical History:   Diagnosis Date    Adverse effect of anesthesia     woke up during cholecystectomy    Asthma     Diabetes ([address]     no meds    Hypertension     no meds    Sleep apnea     no cpap       Past Surgical History:   Procedure Laterality Date    HX ABDOMINAL LAPAROSCOPY  1978    HX APPENDECTOMY      HX CHOLECYSTECTOMY      HX GI      gastric bypass 6/2016    HX HYSTERECTOMY      LAP GASTRIC BYPASS/NEAL-EN-Y  6-27-16    Dr. Odell Gupta       Current Outpatient Medications   Medication Sig Dispense Refill    albuterol (PROVENTIL HFA, VENTOLIN HFA, PROAIR HFA) 90 mcg/actuation inhaler 1-2 Puffs.  Biotin 2,500 mcg cap Take  by mouth.  ascorbic acid, vitamin C, (VITAMIN C) 500 mg tablet Take 1,000 mg by mouth daily.  vitamin e (E GEMS) 1,000 unit capsule Take 1,000 Units by mouth daily.  potassium 99 mg tablet Take 99 mg by mouth daily.       cyanocobalamin (VITAMIN B-12) 1,000 mcg sublingual tablet Take 1,000 mcg by mouth daily.      cholecalciferol, vitamin D3, 2,000 unit tab Take 5,000 Units by mouth.  pediatric multivitamins chewable tablet Take 1 Tab by mouth two (2) times a day.  calcium citrate 200 mg (950 mg) tablet Take 600 mg by mouth daily. Allergies   Allergen Reactions    Codeine Itching     Migraine    Erythromycin Nausea and Vomiting    Percocet [Oxycodone-Acetaminophen] Hives     Denies at present. Thinks hives were from stress. ROS:  Review of Systems   Constitutional: Positive for malaise/fatigue. HENT:        Hair loss    Gastrointestinal: Negative for abdominal pain, constipation, diarrhea, heartburn, nausea and vomiting. Dumping    Neurological: Positive for dizziness. All other systems reviewed and are negative. Physicial Exam:  Visit Vitals  /84   Temp 98.4 °F (36.9 °C)   Ht 5' (1.524 m)   Wt 71.7 kg (158 lb)   BMI 30.86 kg/m²     Physical Exam   Constitutional: She is oriented to person, place, and time and well-developed, well-nourished, and in no distress. HENT:   Head: Normocephalic. Cardiovascular: Normal rate. Pulmonary/Chest: Effort normal.   Abdominal: Soft. She exhibits no distension. There is no tenderness. Musculoskeletal: Normal range of motion. Neurological: She is alert and oriented to person, place, and time. Skin: Skin is warm and dry. Psychiatric: Affect normal.   Nursing note and vitals reviewed.       Labs:  Recent Results (from the past 672 hour(s))   VITAMIN B12 & FOLATE    Collection Time: 10/24/19 10:26 AM   Result Value Ref Range    Vitamin B12 >2,000 (H) 211 - 911 pg/mL    Folate >20.0 (H) 3.10 - 17.50 ng/mL   CBC WITH AUTOMATED DIFF    Collection Time: 10/24/19 10:26 AM   Result Value Ref Range    WBC 6.6 4.6 - 13.2 K/uL    RBC 4.39 4.20 - 5.30 M/uL    HGB 13.3 12.0 - 16.0 g/dL    HCT 40.0 35.0 - 45.0 %    MCV 91.1 74.0 - 97.0 FL    MCH 30.3 24.0 - 34.0 PG    MCHC 33.3 31.0 - 37.0 g/dL    RDW 13.5 11.6 - 14.5 % PLATELET 837 798 - 845 K/uL    MPV 10.3 9.2 - 11.8 FL    NEUTROPHILS 61 40 - 73 %    LYMPHOCYTES 27 21 - 52 %    MONOCYTES 7 3 - 10 %    EOSINOPHILS 5 0 - 5 %    BASOPHILS 0 0 - 2 %    ABS. NEUTROPHILS 4.0 1.8 - 8.0 K/UL    ABS. LYMPHOCYTES 1.8 0.9 - 3.6 K/UL    ABS. MONOCYTES 0.5 0.05 - 1.2 K/UL    ABS. EOSINOPHILS 0.4 0.0 - 0.4 K/UL    ABS. BASOPHILS 0.0 0.0 - 0.1 K/UL    DF AUTOMATED     FERRITIN    Collection Time: 10/24/19 10:26 AM   Result Value Ref Range    Ferritin 33 8 - 388 NG/ML   IRON PROFILE    Collection Time: 10/24/19 10:26 AM   Result Value Ref Range    Iron 173 50 - 175 ug/dL    TIBC 329 250 - 450 ug/dL    Iron % saturation 53 %   LIPID PANEL    Collection Time: 10/24/19 10:26 AM   Result Value Ref Range    LIPID PROFILE          Cholesterol, total 193 <200 MG/DL    Triglyceride 31 <150 MG/DL    HDL Cholesterol 107 (H) 40 - 60 MG/DL    LDL, calculated 79.8 0 - 100 MG/DL    VLDL, calculated 6.2 MG/DL    CHOL/HDL Ratio 1.8 0 - 5.0     MAGNESIUM    Collection Time: 10/24/19 10:26 AM   Result Value Ref Range    Magnesium 1.9 1.6 - 2.6 mg/dL   METABOLIC PANEL, COMPREHENSIVE    Collection Time: 10/24/19 10:26 AM   Result Value Ref Range    Sodium 144 136 - 145 mmol/L    Potassium 4.1 3.5 - 5.5 mmol/L    Chloride 110 100 - 111 mmol/L    CO2 30 21 - 32 mmol/L    Anion gap 4 3.0 - 18 mmol/L    Glucose 87 74 - 99 mg/dL    BUN 24 (H) 7.0 - 18 MG/DL    Creatinine 0.84 0.6 - 1.3 MG/DL    BUN/Creatinine ratio 29 (H) 12 - 20      GFR est AA >60 >60 ml/min/1.73m2    GFR est non-AA >60 >60 ml/min/1.73m2    Calcium 9.2 8.5 - 10.1 MG/DL    Bilirubin, total 0.5 0.2 - 1.0 MG/DL    ALT (SGPT) 41 13 - 56 U/L    AST (SGOT) 22 10 - 38 U/L    Alk.  phosphatase 87 45 - 117 U/L    Protein, total 6.6 6.4 - 8.2 g/dL    Albumin 3.6 3.4 - 5.0 g/dL    Globulin 3.0 2.0 - 4.0 g/dL    A-G Ratio 1.2 0.8 - 1.7     VITAMIN B1, WHOLE BLOOD    Collection Time: 10/24/19 10:26 AM   Result Value Ref Range    Vitamin B1 131.0 66.5 - 200.0 nmol/L   ZINC    Collection Time: 10/24/19 10:26 AM   Result Value Ref Range    Zinc, plasma/serum 87 56 - 134 ug/dL   VITAMIN D, 25 HYDROXY    Collection Time: 10/24/19 10:30 AM   Result Value Ref Range    Vitamin D 25-Hydroxy 39.0 30 - 100 ng/mL         Assessment/Plan: Pt is currently >2 years  s/p laparoscopic gastric bypass surgery with a total weight loss of 76 lbs to date, improved. Stressed importance of hydration with SF clear liquids until urine clear. Continue  with food content mainly protein/meats/veggies. Encouraged support group attendance. Advised exercise program of continue current. Continue MVI/Ca/B12/D3/B1 supplementation. Per the recommendations of [employer] Metabolic & Bariatric Surgery  (ASMBS) we are recommending all our post operative Bariatric patients add Vit B1 100mg daily to their supplement routine at this time. Follow up yearly with labs prior.       >50% of 20 min visit spent counseling     ELÍAS Cabrales

## 2019-12-05 ENCOUNTER — HOSPITAL ENCOUNTER (OUTPATIENT)
Dept: MAMMOGRAPHY | Age: 59
Discharge: HOME | End: 2019-12-05
Attending: FAMILY MEDICINE
Payer: COMMERCIAL

## 2019-12-05 DIAGNOSIS — Z12.31 VISIT FOR SCREENING MAMMOGRAM: ICD-10-CM

## 2019-12-05 PROCEDURE — 77067 SCR MAMMO BI INCL CAD: CPT

## 2020-01-10 ENCOUNTER — HOSPITAL ENCOUNTER (OUTPATIENT)
Dept: ULTRASOUND IMAGING | Age: 60
Discharge: HOME | End: 2020-01-10
Payer: MEDICAID

## 2020-01-10 ENCOUNTER — HOSPITAL ENCOUNTER (OUTPATIENT)
Dept: MAMMOGRAPHY | Age: 60
Discharge: HOME | End: 2020-01-10
Payer: MEDICAID

## 2020-01-10 DIAGNOSIS — R92.8 ABNORMAL MAMMOGRAM: ICD-10-CM

## 2020-01-10 PROCEDURE — 76642 ULTRASOUND BREAST LIMITED: CPT

## 2020-01-10 PROCEDURE — 77062 BREAST TOMOSYNTHESIS BI: CPT

## 2020-10-16 ENCOUNTER — TRANSCRIBE ORDER (OUTPATIENT)
Dept: SCHEDULING | Age: 60
End: 2020-10-16

## 2020-10-16 ENCOUNTER — HOSPITAL ENCOUNTER (OUTPATIENT)
Dept: ULTRASOUND IMAGING | Age: 60
Discharge: HOME | End: 2020-10-16
Attending: FAMILY MEDICINE
Payer: MEDICAID

## 2020-10-16 ENCOUNTER — HOSPITAL ENCOUNTER (OUTPATIENT)
Dept: MAMMOGRAPHY | Age: 60
Discharge: HOME | End: 2020-10-16
Attending: FAMILY MEDICINE
Payer: MEDICAID

## 2020-10-16 DIAGNOSIS — N64.89 GALACTOCELE: ICD-10-CM

## 2020-10-16 DIAGNOSIS — R92.8 ABNORMAL MAMMOGRAM: ICD-10-CM

## 2020-10-16 PROCEDURE — 76642 ULTRASOUND BREAST LIMITED: CPT

## 2020-10-16 PROCEDURE — 77062 BREAST TOMOSYNTHESIS BI: CPT

## 2020-10-19 ENCOUNTER — TRANSCRIBE ORDER (OUTPATIENT)
Dept: SCHEDULING | Age: 60
End: 2020-10-19

## 2020-10-19 DIAGNOSIS — H05.20 PROPTOSIS: ICD-10-CM

## 2020-10-19 DIAGNOSIS — R79.89 LOW TSH LEVEL: Primary | ICD-10-CM

## 2020-10-24 ENCOUNTER — HOSPITAL ENCOUNTER (OUTPATIENT)
Dept: ULTRASOUND IMAGING | Age: 60
Discharge: HOME | End: 2020-10-24
Attending: FAMILY MEDICINE
Payer: MEDICAID

## 2020-10-24 DIAGNOSIS — R79.89 LOW TSH LEVEL: ICD-10-CM

## 2020-10-24 DIAGNOSIS — H05.20 PROPTOSIS: ICD-10-CM

## 2020-10-24 PROCEDURE — 76536 US EXAM OF HEAD AND NECK: CPT

## 2024-02-02 DIAGNOSIS — K91.2 POSTOPERATIVE MALABSORPTION: Primary | ICD-10-CM

## 2024-02-07 ENCOUNTER — HOSPITAL ENCOUNTER (OUTPATIENT)
Facility: HOSPITAL | Age: 64
Discharge: HOME | End: 2024-02-10
Payer: COMMERCIAL

## 2024-02-07 DIAGNOSIS — K91.2 POSTOPERATIVE MALABSORPTION: ICD-10-CM

## 2024-02-07 LAB
25(OH)D3 SERPL-MCNC: 56.2 NG/ML (ref 30–100)
ALBUMIN SERPL-MCNC: 3.5 G/DL (ref 3.4–5)
ALBUMIN/GLOB SERPL: 1.1 (ref 0.8–1.7)
ALP SERPL-CCNC: 84 U/L (ref 45–117)
ALT SERPL-CCNC: 52 U/L (ref 13–56)
AMPHET UR QL SCN: NEGATIVE
ANION GAP SERPL CALC-SCNC: 2 MMOL/L (ref 3–18)
AST SERPL-CCNC: 26 U/L (ref 10–38)
BARBITURATES UR QL SCN: NEGATIVE
BASOPHILS # BLD: 0 K/UL (ref 0–0.1)
BASOPHILS NFR BLD: 0 % (ref 0–2)
BENZODIAZ UR QL: NEGATIVE
BILIRUB SERPL-MCNC: 0.5 MG/DL (ref 0.2–1)
BUN SERPL-MCNC: 16 MG/DL (ref 7–18)
BUN/CREAT SERPL: 19 (ref 12–20)
CALCIUM SERPL-MCNC: 9.3 MG/DL (ref 8.5–10.1)
CANNABINOIDS UR QL SCN: NEGATIVE
CHLORIDE SERPL-SCNC: 108 MMOL/L (ref 100–111)
CHOLEST SERPL-MCNC: 184 MG/DL
CO2 SERPL-SCNC: 32 MMOL/L (ref 21–32)
COCAINE UR QL SCN: NEGATIVE
CREAT SERPL-MCNC: 0.83 MG/DL (ref 0.6–1.3)
DIFFERENTIAL METHOD BLD: NORMAL
EOSINOPHIL # BLD: 0.2 K/UL (ref 0–0.4)
EOSINOPHIL NFR BLD: 3 % (ref 0–5)
ERYTHROCYTE [DISTWIDTH] IN BLOOD BY AUTOMATED COUNT: 13.2 % (ref 11.6–14.5)
EST. AVERAGE GLUCOSE BLD GHB EST-MCNC: 123 MG/DL
GLOBULIN SER CALC-MCNC: 3.1 G/DL (ref 2–4)
GLUCOSE SERPL-MCNC: 98 MG/DL (ref 74–99)
HBA1C MFR BLD: 5.9 % (ref 4.2–5.6)
HCT VFR BLD AUTO: 44.2 % (ref 35–45)
HDLC SERPL-MCNC: 97 MG/DL (ref 40–60)
HDLC SERPL: 1.9 (ref 0–5)
HGB BLD-MCNC: 14.4 G/DL (ref 12–16)
IMM GRANULOCYTES # BLD AUTO: 0 K/UL (ref 0–0.04)
IMM GRANULOCYTES NFR BLD AUTO: 0 % (ref 0–0.5)
IRON SERPL-MCNC: 144 UG/DL (ref 50–175)
LDLC SERPL CALC-MCNC: 79.2 MG/DL (ref 0–100)
LIPID PANEL: ABNORMAL
LYMPHOCYTES # BLD: 2.1 K/UL (ref 0.9–3.6)
LYMPHOCYTES NFR BLD: 33 % (ref 21–52)
Lab: NORMAL
MCH RBC QN AUTO: 30.8 PG (ref 24–34)
MCHC RBC AUTO-ENTMCNC: 32.6 G/DL (ref 31–37)
MCV RBC AUTO: 94.6 FL (ref 78–100)
METHADONE UR QL: NEGATIVE
MONOCYTES # BLD: 0.4 K/UL (ref 0.05–1.2)
MONOCYTES NFR BLD: 7 % (ref 3–10)
NEUTS SEG # BLD: 3.8 K/UL (ref 1.8–8)
NEUTS SEG NFR BLD: 58 % (ref 40–73)
NRBC # BLD: 0 K/UL (ref 0–0.01)
NRBC BLD-RTO: 0 PER 100 WBC
OPIATES UR QL: NEGATIVE
PCP UR QL: NEGATIVE
PLATELET # BLD AUTO: 298 K/UL (ref 135–420)
PMV BLD AUTO: 10.7 FL (ref 9.2–11.8)
POTASSIUM SERPL-SCNC: 4.5 MMOL/L (ref 3.5–5.5)
PROT SERPL-MCNC: 6.6 G/DL (ref 6.4–8.2)
RBC # BLD AUTO: 4.67 M/UL (ref 4.2–5.3)
SODIUM SERPL-SCNC: 142 MMOL/L (ref 136–145)
TRIGL SERPL-MCNC: 39 MG/DL
TSH SERPL DL<=0.05 MIU/L-ACNC: 1.14 UIU/ML (ref 0.36–3.74)
VIT B12 SERPL-MCNC: >2000 PG/ML (ref 211–911)
VLDLC SERPL CALC-MCNC: 7.8 MG/DL
WBC # BLD AUTO: 6.6 K/UL (ref 4.6–13.2)

## 2024-02-07 PROCEDURE — 82306 VITAMIN D 25 HYDROXY: CPT

## 2024-02-07 PROCEDURE — 80053 COMPREHEN METABOLIC PANEL: CPT

## 2024-02-07 PROCEDURE — 82607 VITAMIN B-12: CPT

## 2024-02-07 PROCEDURE — 80061 LIPID PANEL: CPT

## 2024-02-07 PROCEDURE — 83540 ASSAY OF IRON: CPT

## 2024-02-07 PROCEDURE — 84443 ASSAY THYROID STIM HORMONE: CPT

## 2024-02-07 PROCEDURE — 80307 DRUG TEST PRSMV CHEM ANLYZR: CPT

## 2024-02-07 PROCEDURE — 85025 COMPLETE CBC W/AUTO DIFF WBC: CPT

## 2024-02-07 PROCEDURE — 83036 HEMOGLOBIN GLYCOSYLATED A1C: CPT

## 2024-02-07 PROCEDURE — 36415 COLL VENOUS BLD VENIPUNCTURE: CPT

## 2024-02-07 PROCEDURE — 84425 ASSAY OF VITAMIN B-1: CPT

## 2024-02-13 LAB — VIT B1 BLD-SCNC: 145.6 NMOL/L (ref 66.5–200)

## 2024-02-22 ENCOUNTER — OFFICE VISIT (OUTPATIENT)
Age: 64
End: 2024-02-22
Payer: COMMERCIAL

## 2024-02-22 VITALS
BODY MASS INDEX: 35.34 KG/M2 | RESPIRATION RATE: 18 BRPM | WEIGHT: 180 LBS | DIASTOLIC BLOOD PRESSURE: 86 MMHG | HEIGHT: 60 IN | HEART RATE: 76 BPM | TEMPERATURE: 97.8 F | SYSTOLIC BLOOD PRESSURE: 138 MMHG

## 2024-02-22 DIAGNOSIS — I10 ESSENTIAL HYPERTENSION: ICD-10-CM

## 2024-02-22 DIAGNOSIS — Z98.84 BARIATRIC SURGERY STATUS: Primary | ICD-10-CM

## 2024-02-22 PROCEDURE — 99204 OFFICE O/P NEW MOD 45 MIN: CPT | Performed by: STUDENT IN AN ORGANIZED HEALTH CARE EDUCATION/TRAINING PROGRAM

## 2024-02-22 PROCEDURE — 1123F ACP DISCUSS/DSCN MKR DOCD: CPT | Performed by: STUDENT IN AN ORGANIZED HEALTH CARE EDUCATION/TRAINING PROGRAM

## 2024-02-22 PROCEDURE — 3079F DIAST BP 80-89 MM HG: CPT | Performed by: STUDENT IN AN ORGANIZED HEALTH CARE EDUCATION/TRAINING PROGRAM

## 2024-02-22 PROCEDURE — 3075F SYST BP GE 130 - 139MM HG: CPT | Performed by: STUDENT IN AN ORGANIZED HEALTH CARE EDUCATION/TRAINING PROGRAM

## 2024-02-22 RX ORDER — FLUTICASONE PROPIONATE 44 UG/1
1 AEROSOL, METERED RESPIRATORY (INHALATION) 2 TIMES DAILY
COMMUNITY
Start: 2021-04-23

## 2024-02-22 RX ORDER — AMLODIPINE BESYLATE 5 MG/1
5 TABLET ORAL DAILY
COMMUNITY

## 2024-02-22 NOTE — PROGRESS NOTES
Chief Complaint   Patient presents with    Follow-up     Gastric bypass Dr Ortega 6/27/2016 weight regain     Bariatric Postoperative Nurse Note      Donna Nair Jany is a 63 y.o. female status post laparoscopic gastric bypass surgery performed on 6/27/2016    All Post-Ops (including two weeks)  -# of grams of protein daily? 40-60  -sources of protein? Shake meat  -# of oz of sugar free fluids from all sources daily?  64 plus  -Nausea? No  -Vomiting? No  -Difficulty swallow/food sticking? No  -Heartburn/regurgitation? No  -Character of bowel movements (diarrhea/constipation/bloody stools?) regular  -Which multivitamin product are you taking? Flintstones   -What dose and how frequently are you taking calcium citrate? citracal 600 milligram daily  - from any iron-containing multivitamin by 2 hours? No  -Ulcer risk exposures:   NSAID No  Tobacco No  Alcohol No  Steroids No  -Minutes of physical activity and what type? 20 min 3 times a week

## 2024-02-22 NOTE — PROGRESS NOTES
Bariatric Surgery Initial Consult    Patient: Donna Carmichael MRN: [id number]  SSN: [id number]    YOB: 1960  Age: 63 y.o.  Sex: female      Subjective:      CC: Weight regain status post laparoscopic gastric bypass    Current Weight: 180  Body mass index is 35.15 kg/m².  Ideal body weight: 45.5 kg (100 lb 4.9 oz)  Adjusted ideal body weight: 60 kg (132 lb 3 oz)  Excess Body Weight:     Donna Carmichael is a 63 y.o. female who is being seen for new bariatric consultation.  She has a past surgical history of laparoscopic gastric bypass performed in 2016 by Dr. Ortega.  She notes that her weight decreased from a preoperative weight of 234 lbs to 147 lbs post-surgery. However, she has experienced weight gain since then, with her current weight at 180 lbs and a BMI of just over 35. Prior to the surgery, Donna had high blood pressure, diabetes, and was on a regimen of multiple medications, including blood pressure medications, diuretics, pain meds, and CPAP. Post-surgery, her medication was reduced to fluticasone for asthma and allergy medication. With the recent weight gain, she reports an increase in blood pressure and is now taking 5 mg of amlodipine daily.    She does not note any specific dietary changes which led to the weight regain, aside from during COVID.  Concerned about her weight regain, Donna expresses interest in exploring a combination of solutions, including medication, diet and exercise, and potentially endoscopic procedures. She is aware of the possibility of a fistula between her gastric pouch and the old stomach, which could be a contributing factor to her weight regain.      Past Medical History:   Diagnosis Date    Adverse effect of anesthesia     woke up during cholecystectomy    Asthma     Diabetes ([facility])     no meds    Hypertension     no meds    Menopause     Sleep apnea     no cpap     Past Surgical History:   Procedure Laterality Date    APPENDECTOMY

## 2024-02-22 NOTE — H&P (VIEW-ONLY)
palpitations    GI: Denies dysphagia, recurrent emesis, hematemesis, changes in bowel habits, hematochezia, melena    : Denies hematuria frequency urgency dysuria    Musculoskeletal: Denies fractures, dislocations    Neurologic: Denies history of CVA, paralysis paresthesias, recurrent cephalgia, seizures    Endocrine: Denies polyuria, polydipsia, polyphagia, heat and cold intolerance    Lymph/heme: Denies a history of malignancy, anemia, bruising, blood transfusions    Integumentary: Negative for dermatitis     Psych: Denies a history of depression or anxiety    Objective:     Vitals:    02/22/24 0917   BP: 138/86   Pulse: 76   Resp: 18   Temp: 97.8 °F (36.6 °C)   Weight: 81.6 kg (180 lb)   Height: 1.524 m (5')        General: no acute distress, nontoxic in appearance.  Head: Normocephalic, atraumatic  Mouth: Clear, no overt lesions,  Neck: Supple, no masses, trachea midline  Resp: Unlabored on room air. Excursions normal and symmetrical.  Cardio: Regular rate and rhythm  Abdomen: soft, nontender, nondistended, no hernias.  Extremities: Warm, well perfused, no edema or varicosities  Neuro: Sensation and strength grossly intact and symmetrical.  Psych: Alert and oriented to person, place, and time.    Labs:     Lab Results   Component Value Date/Time    WBC 6.6 02/07/2024 09:56 AM    HGB 14.4 02/07/2024 09:56 AM    HCT 44.2 02/07/2024 09:56 AM     02/07/2024 09:56 AM    MCV 94.6 02/07/2024 09:56 AM     Lab Results   Component Value Date/Time     02/07/2024 09:56 AM    K 4.5 02/07/2024 09:56 AM     02/07/2024 09:56 AM    CO2 32 02/07/2024 09:56 AM    BUN 16 02/07/2024 09:56 AM    GFRAA >60 10/24/2019 10:26 AM    GLOB 3.1 02/07/2024 09:56 AM    ALT 52 02/07/2024 09:56 AM     Lab Results   Component Value Date/Time    IRON 144 02/07/2024 09:56 AM    TIBC 329 10/24/2019 10:26 AM     No results found for: \"FOL\", \"RBCF\"  No components found for: \"HGBA1C\"    Assessment:     This patient is a 63 y.o.  obese female with a current Body mass index is 35.15 kg/m². who is status post laparoscopic gastric bypass in 2016 with partial recidivism and recurrence of her hypertension associated with her weight regain.    Plan:     1. Obesity with Weight Regain Post-Gastric Bypass Surgery     - Plan: Refer the patient to  [facility] for dietary counseling and support. Schedule an EGD to examine the anatomy and assess for anatomic reasons for weight regain, such as a gastrogastric fistula or enlarged gastrojejunostomy. Consider endoscopic transoral outlet reduction (e-TOR) or fistula closure if indicated during EGD.    2. Hypertension     - Plan: Continue amlodipine 5 mg daily. Regular monitoring of blood pressure is advised.    3. Prediabetes     - Plan: Monitor blood sugar levels and encourage lifestyle modifications, including diet and exercise.    4. History of Asthma and Allergies     - Plan: Continue fluticasone as needed for symptom management.    5.  Medical weight loss     - Plan: Schedule follow-up with one of our medical weight loss providers to discuss medical options to augment weight loss    6. Labs and Surveillance     - Plan: Patient has not had routine bariatric surveillance labs done in several years.  These were ordered.      I spent >60 minutes on this patient's care today, including reviewing all pertinent medical records, imaging, performing a history and physical exam, documenting, and coordinating future care.    Signed By: Adi Mcintyre MD     February 22, 2024

## 2024-02-28 NOTE — PERIOP NOTE
Instructions for your surgery at [facility]      Today's Date:  2/28/2024      Patient's Name:  Donna Carmichael           Surgery Date:  3/1              Please enter the main entrance of the hospital and check-in at the  located in the lobby. Once checked in at the , you will take the elevators to the second floor, and report to the waiting room on the left. The room will say Procedure Registration.    Do NOT eat or drink anything, including candy, gum, or ice chips after midnight prior to your surgery, unless you have specific instructions from your surgeon or anesthesia provider to do so.  Brush your teeth before coming to the hospital. You may swish with water, but do not swallow.  No smoking/Vaping/E-Cigarettes 24 hours prior to the day of surgery.  No alcohol 24 hours prior to the day of surgery.  No recreational drugs for one week prior to the day of surgery.  Bring Photo ID, Insurance information, and Co-pay if required on day of surgery.  Bring in pertinent legal documents, such as, Medical Power of , DNR, Advance Directive, etc.  Leave all valuables, including money/purse, at home.  Remove all jewelry, including ALL body piercings, nail polish, acrylic nails, and makeup (including mascara); no lotions, powders, deodorant, or perfume/cologne/after shave on the skin.  Follow instruction for Hibiclens washes and CHG wipes from surgeon's office.   Glasses and dentures may be worn to the hospital. They must be removed prior to surgery. Please bring case/container for glasses or dentures.   Contact lenses should not be worn on day of surgery.   Call your doctor's office if symptoms of a cold or illness develop within 24-48 hours prior to your surgery.  Call your doctor's office if you have any questions concerning insurance or co-pays.  15. AN ADULT (relative or friend 18 years or older) MUST DRIVE YOU HOME AFTER YOUR SURGERY.  16. Please make

## 2024-02-29 ENCOUNTER — ANESTHESIA EVENT (OUTPATIENT)
Facility: HOSPITAL | Age: 64
End: 2024-02-29
Payer: MEDICARE

## 2024-02-29 ENCOUNTER — TELEPHONE (OUTPATIENT)
Age: 64
End: 2024-02-29

## 2024-02-29 ENCOUNTER — PREP FOR PROCEDURE (OUTPATIENT)
Age: 64
End: 2024-02-29

## 2024-02-29 RX ORDER — SODIUM CHLORIDE 0.9 % (FLUSH) 0.9 %
5-40 SYRINGE (ML) INJECTION EVERY 12 HOURS SCHEDULED
Status: CANCELLED | OUTPATIENT
Start: 2024-02-29

## 2024-02-29 RX ORDER — SODIUM CHLORIDE 0.9 % (FLUSH) 0.9 %
5-40 SYRINGE (ML) INJECTION PRN
Status: CANCELLED | OUTPATIENT
Start: 2024-02-29

## 2024-02-29 RX ORDER — SODIUM CHLORIDE 9 MG/ML
INJECTION, SOLUTION INTRAVENOUS PRN
Status: CANCELLED | OUTPATIENT
Start: 2024-02-29

## 2024-03-01 ENCOUNTER — HOSPITAL ENCOUNTER (OUTPATIENT)
Facility: HOSPITAL | Age: 64
Setting detail: OUTPATIENT SURGERY
Discharge: HOME | End: 2024-03-01
Attending: STUDENT IN AN ORGANIZED HEALTH CARE EDUCATION/TRAINING PROGRAM | Admitting: STUDENT IN AN ORGANIZED HEALTH CARE EDUCATION/TRAINING PROGRAM
Payer: MEDICARE

## 2024-03-01 ENCOUNTER — ANESTHESIA (OUTPATIENT)
Facility: HOSPITAL | Age: 64
End: 2024-03-01
Payer: MEDICARE

## 2024-03-01 VITALS
DIASTOLIC BLOOD PRESSURE: 84 MMHG | WEIGHT: 180.6 LBS | BODY MASS INDEX: 35.46 KG/M2 | RESPIRATION RATE: 16 BRPM | HEIGHT: 60 IN | HEART RATE: 77 BPM | SYSTOLIC BLOOD PRESSURE: 118 MMHG | OXYGEN SATURATION: 98 % | TEMPERATURE: 97.9 F

## 2024-03-01 PROCEDURE — 3700000000 HC ANESTHESIA ATTENDED CARE: Performed by: STUDENT IN AN ORGANIZED HEALTH CARE EDUCATION/TRAINING PROGRAM

## 2024-03-01 PROCEDURE — 2580000003 HC RX 258: Performed by: STUDENT IN AN ORGANIZED HEALTH CARE EDUCATION/TRAINING PROGRAM

## 2024-03-01 PROCEDURE — 2709999900 HC NON-CHARGEABLE SUPPLY: Performed by: STUDENT IN AN ORGANIZED HEALTH CARE EDUCATION/TRAINING PROGRAM

## 2024-03-01 PROCEDURE — 88305 TISSUE EXAM BY PATHOLOGIST: CPT

## 2024-03-01 PROCEDURE — 3600007512: Performed by: STUDENT IN AN ORGANIZED HEALTH CARE EDUCATION/TRAINING PROGRAM

## 2024-03-01 PROCEDURE — 7100000001 HC PACU RECOVERY - ADDTL 15 MIN: Performed by: STUDENT IN AN ORGANIZED HEALTH CARE EDUCATION/TRAINING PROGRAM

## 2024-03-01 PROCEDURE — 93005 ELECTROCARDIOGRAM TRACING: CPT | Performed by: NURSE ANESTHETIST, CERTIFIED REGISTERED

## 2024-03-01 PROCEDURE — 6360000002 HC RX W HCPCS: Performed by: NURSE ANESTHETIST, CERTIFIED REGISTERED

## 2024-03-01 PROCEDURE — 7100000000 HC PACU RECOVERY - FIRST 15 MIN: Performed by: STUDENT IN AN ORGANIZED HEALTH CARE EDUCATION/TRAINING PROGRAM

## 2024-03-01 PROCEDURE — 7100000011 HC PHASE II RECOVERY - ADDTL 15 MIN: Performed by: STUDENT IN AN ORGANIZED HEALTH CARE EDUCATION/TRAINING PROGRAM

## 2024-03-01 PROCEDURE — 6370000000 HC RX 637 (ALT 250 FOR IP): Performed by: STUDENT IN AN ORGANIZED HEALTH CARE EDUCATION/TRAINING PROGRAM

## 2024-03-01 PROCEDURE — 3600007502: Performed by: STUDENT IN AN ORGANIZED HEALTH CARE EDUCATION/TRAINING PROGRAM

## 2024-03-01 PROCEDURE — 2500000003 HC RX 250 WO HCPCS: Performed by: NURSE ANESTHETIST, CERTIFIED REGISTERED

## 2024-03-01 PROCEDURE — 43239 EGD BIOPSY SINGLE/MULTIPLE: CPT | Performed by: STUDENT IN AN ORGANIZED HEALTH CARE EDUCATION/TRAINING PROGRAM

## 2024-03-01 PROCEDURE — 7100000010 HC PHASE II RECOVERY - FIRST 15 MIN: Performed by: STUDENT IN AN ORGANIZED HEALTH CARE EDUCATION/TRAINING PROGRAM

## 2024-03-01 PROCEDURE — 3700000001 HC ADD 15 MINUTES (ANESTHESIA): Performed by: STUDENT IN AN ORGANIZED HEALTH CARE EDUCATION/TRAINING PROGRAM

## 2024-03-01 RX ORDER — SIMETHICONE 40MG/0.6ML
SUSPENSION, DROPS(FINAL DOSAGE FORM)(ML) ORAL PRN
Status: DISCONTINUED | OUTPATIENT
Start: 2024-03-01 | End: 2024-03-01 | Stop reason: ALTCHOICE

## 2024-03-01 RX ORDER — LIDOCAINE HYDROCHLORIDE 10 MG/ML
1 INJECTION, SOLUTION EPIDURAL; INFILTRATION; INTRACAUDAL; PERINEURAL
Status: DISCONTINUED | OUTPATIENT
Start: 2024-03-01 | End: 2024-03-01 | Stop reason: HOSPADM

## 2024-03-01 RX ORDER — SODIUM CHLORIDE 0.9 % (FLUSH) 0.9 %
5-40 SYRINGE (ML) INJECTION EVERY 12 HOURS SCHEDULED
Status: DISCONTINUED | OUTPATIENT
Start: 2024-03-01 | End: 2024-03-01 | Stop reason: HOSPADM

## 2024-03-01 RX ORDER — ONDANSETRON 2 MG/ML
4 INJECTION INTRAMUSCULAR; INTRAVENOUS
Status: DISCONTINUED | OUTPATIENT
Start: 2024-03-01 | End: 2024-03-01 | Stop reason: HOSPADM

## 2024-03-01 RX ORDER — PROPOFOL 10 MG/ML
INJECTION, EMULSION INTRAVENOUS PRN
Status: DISCONTINUED | OUTPATIENT
Start: 2024-03-01 | End: 2024-03-01 | Stop reason: SDUPTHER

## 2024-03-01 RX ORDER — INSULIN LISPRO 100 [IU]/ML
0-16 INJECTION, SOLUTION INTRAVENOUS; SUBCUTANEOUS AS NEEDED
Status: DISCONTINUED | OUTPATIENT
Start: 2024-03-01 | End: 2024-03-01 | Stop reason: HOSPADM

## 2024-03-01 RX ORDER — SODIUM CHLORIDE 9 MG/ML
INJECTION, SOLUTION INTRAVENOUS PRN
Status: DISCONTINUED | OUTPATIENT
Start: 2024-03-01 | End: 2024-03-01 | Stop reason: HOSPADM

## 2024-03-01 RX ORDER — SODIUM CHLORIDE 0.9 % (FLUSH) 0.9 %
5-40 SYRINGE (ML) INJECTION PRN
Status: DISCONTINUED | OUTPATIENT
Start: 2024-03-01 | End: 2024-03-01 | Stop reason: HOSPADM

## 2024-03-01 RX ORDER — SODIUM CHLORIDE, SODIUM LACTATE, POTASSIUM CHLORIDE, CALCIUM CHLORIDE 600; 310; 30; 20 MG/100ML; MG/100ML; MG/100ML; MG/100ML
INJECTION, SOLUTION INTRAVENOUS CONTINUOUS
Status: DISCONTINUED | OUTPATIENT
Start: 2024-03-01 | End: 2024-03-01 | Stop reason: HOSPADM

## 2024-03-01 RX ORDER — GLYCOPYRROLATE 0.2 MG/ML
INJECTION INTRAMUSCULAR; INTRAVENOUS PRN
Status: DISCONTINUED | OUTPATIENT
Start: 2024-03-01 | End: 2024-03-01 | Stop reason: SDUPTHER

## 2024-03-01 RX ORDER — LIDOCAINE HYDROCHLORIDE 20 MG/ML
INJECTION, SOLUTION EPIDURAL; INFILTRATION; INTRACAUDAL; PERINEURAL PRN
Status: DISCONTINUED | OUTPATIENT
Start: 2024-03-01 | End: 2024-03-01 | Stop reason: SDUPTHER

## 2024-03-01 RX ADMIN — PROPOFOL 50 MG: 10 INJECTION, EMULSION INTRAVENOUS at 09:22

## 2024-03-01 RX ADMIN — SODIUM CHLORIDE, POTASSIUM CHLORIDE, SODIUM LACTATE AND CALCIUM CHLORIDE: 600; 310; 30; 20 INJECTION, SOLUTION INTRAVENOUS at 09:05

## 2024-03-01 RX ADMIN — LIDOCAINE HYDROCHLORIDE 100 MG: 20 INJECTION, SOLUTION EPIDURAL; INFILTRATION; INTRACAUDAL; PERINEURAL at 09:18

## 2024-03-01 RX ADMIN — PROPOFOL 100 MG: 10 INJECTION, EMULSION INTRAVENOUS at 09:19

## 2024-03-01 RX ADMIN — PROPOFOL 50 MG: 10 INJECTION, EMULSION INTRAVENOUS at 09:25

## 2024-03-01 RX ADMIN — GLYCOPYRROLATE 0.2 MG: 0.2 INJECTION INTRAMUSCULAR; INTRAVENOUS at 09:14

## 2024-03-01 ASSESSMENT — PAIN - FUNCTIONAL ASSESSMENT
PAIN_FUNCTIONAL_ASSESSMENT: 0-10
PAIN_FUNCTIONAL_ASSESSMENT: NONE - DENIES PAIN

## 2024-03-01 NOTE — DISCHARGE SUMMARY
Bariatric Surgery Endoscopy Progress Note    Admission Date: 3/1/2024    Discharge Date: 3/1/2024    Admission Diagnosis: Weight regain    Discharge Diagnosis: Same     Procedures: EGD with biopsy    Postop Complications: None    Hospital Course:  Patient was admitted on 3/1/2024 for scheduled outpatient endoscopy.  Operation was without significant complication.   Patient was stable postoperatively and tolerating liquids in the recovery area. All vitals were normal and she emerged from anesthesia without incident and subsequently discharged home.    Discharge Diet:  Continue liquid diet and advance to regular as tolerated    Discharge Medications:     Medication List        CONTINUE taking these medications      albuterol sulfate  (90 Base) MCG/ACT inhaler  Commonly known as: PROVENTIL;VENTOLIN;PROAIR     amLODIPine 5 MG tablet  Commonly known as: NORVASC     ascorbic acid 500 MG tablet  Commonly known as: VITAMIN C     Biotin 2.5 MG Caps     calcium citrate 950 (200 Ca) MG tablet  Commonly known as: CALCITRATE     Cholecalciferol 50 MCG (2000 UT) Tabs     Cyanocobalamin 1000 MCG Subl     Flovent HFA 44 MCG/ACT inhaler  Generic drug: fluticasone     NONFORMULARY     potassium gluconate 550 mg tablet                Discharge disposition: home    Discharge condition: stable      Local wound care: None     Activity: as desired      Special Instructions:            - No driving the day of your procedure            - If a biopsy was taken, avoid aspirin or NSAIDs for 48 hours.            - Your results will be available for discussion at your next appointment            - Notify [facility] for a Temp >100.5 or Pulse>115     Follow-up with your surgeon in as directed, or call office for appointment  [phone]

## 2024-03-01 NOTE — ANESTHESIA POSTPROCEDURE EVALUATION
Department of Anesthesiology  Postprocedure Note    Patient: Donna Carmichael  MRN: [id number]  YOB: 1960  Date of evaluation: 3/1/2024    Procedure Summary       Date: 03/01/24 Room / Location: [facility] ENDO 01 / [facility] ENDOSCOPY    Anesthesia Start: 0914 Anesthesia Stop: 0940    Procedure: ESOPHAGOGASTRODUODENOSCOPY WITH BIOPSY (Upper GI Region) Diagnosis:       Bariatric surgery status      (Bariatric surgery status [Z98.84])    Surgeons: Adi Mcintyre MD Responsible Provider: Kaveh Santos MD    Anesthesia Type: MAC ASA Status: 2            Anesthesia Type: MAC    Tashia Phase I: Tashia Score: 9    Tashia Phase II: Tashia Score: 10    Anesthesia Post Evaluation    Patient location during evaluation: PACU  Patient participation: complete - patient participated  Level of consciousness: sleepy but conscious  Pain score: 0  Airway patency: patent  Nausea & Vomiting: no nausea and no vomiting  Cardiovascular status: blood pressure returned to baseline  Respiratory status: acceptable  Hydration status: euvolemic  Pain management: adequate    No notable events documented.

## 2024-03-01 NOTE — ANESTHESIA PRE PROCEDURE
Department of Anesthesiology  Preprocedure Note       Name:  Donna Carmichael   Age:  63 y.o.  :  1960                                          MRN:  [id number]         Date:  3/1/2024      Surgeon: Surgeon(s):  Adi Mcintyre MD    Procedure: Procedure(s):  ESOPHAGOGASTRODUODENOSCOPY WITH BIOPSY    Medications prior to admission:   Prior to Admission medications    Medication Sig Start Date End Date Taking? Authorizing Provider   NONFORMULARY Abbot complete twice a day    Ronald Stark MD   amLODIPine (NORVASC) 5 MG tablet Take 1 tablet by mouth daily    Ronald Stark MD   fluticasone (FLOVENT HFA) 44 MCG/ACT inhaler Inhale 1 puff into the lungs 2 times daily 21   Ronald Stark MD   albuterol sulfate HFA (PROVENTIL;VENTOLIN;PROAIR) 108 (90 Base) MCG/ACT inhaler 1-2 puffs 18   Automatic Reconciliation, Ar   ascorbic acid (VITAMIN C) 500 MG tablet Take 2 tablets by mouth daily    Automatic Reconciliation, Ar   Biotin 2.5 MG CAPS Take by mouth    Automatic Reconciliation, Ar   calcium citrate (CALCITRATE) 950 (200 Ca) MG tablet Take 3 tablets by mouth daily    Automatic Reconciliation, Ar   Cholecalciferol 50 MCG (2000 UT) TABS Take 2.5 tablets by mouth    Automatic Reconciliation, Ar   Cyanocobalamin 1000 MCG SUBL Take 1,000 mcg by mouth daily    Automatic Reconciliation, Ar   potassium gluconate 550 mg tablet Take 99 mg by mouth daily    Automatic Reconciliation, Ar       Current medications:    Current Facility-Administered Medications   Medication Dose Route Frequency Provider Last Rate Last Admin   • lidocaine PF 1 % injection 1 mL  1 mL IntraDERmal Once PRN Consuelo Suresh APRN - CRNA       • sodium chloride flush 0.9 % injection 5-40 mL  5-40 mL IntraVENous 2 times per day Consuelo Suresh APRN - CRNA       • sodium chloride flush 0.9 % injection 5-40 mL  5-40 mL IntraVENous PRN Consuelo Suresh APRN - CRNA       • 0.9 % sodium chloride infusion

## 2024-03-01 NOTE — OP NOTE
OPERATIVE REPORT     Patient:Donna Carmichael   : 1960  Medical Record Number:[id number]    Pre-operative Diagnosis:  Bariatric surgery status [Z98.84]                  Post-operative Diagnosis: Bariatric surgery status [Z98.84]                 Procedure: EGD with biopsy                  Location: [facility]                  Surgeon: Adi Mcintyre MD                  Assistant:   None    Anesthesia: MAC    Specimen:  Z line    EBL: less than 10 cc    Complications: none    Procedure in Detail: Donna Carmichael was identified in the pre-operative holding area. Informed consent was obtained after a complete discussion of risks, benefits and alternatives to surgery were had with the patient.    The patient was brought back to the endoscopy room and placed under MAC in the semi upright/ left lateral decubitus position on the operating room table. A timeout was performed verifying patient identity, planned procedure, medications, and all other pertinent aspects of the case.  The patient was appropriately padded and secured to the table. A bite block was applied.     Once adequate sedation was achieved, the gastroscope was introduced transorally into the esophagus. The esophagus was traversed, and the scope carefully advanced under direct visualization to the proximal Wendy limb. The endoscope was slowly withdrawn, and at the conclusion of the procedure, the traversed foregut was decompressed as the endoscope removed. Pertinent findings as below:    Esophagus: There was no esophagitis present. Z line noted at 35 cm. Diaphgragmatic pinch noted at 35 cm. There was no apparent hiatal hernia.     Gastric Pouch: Gastric pouch was insufflated and the length of the pouch was somewhat short at 3 cm. It was of normal width. Gastrojejunostomy appeared to be mildly dilated. There were no marginal ulcers or apparent fistulae present. There was no gastritis of the pouch.    Jejunum: The  proximal Wendy limb was traversed. The blind end was 1 cm. The proximal Wendy limb was otherwise unremarkable.     The patient tolerated the procedure without incident and was awakened and transferred to PACU.      Adi Mcintyre MD, FACS, [facility]

## 2024-03-01 NOTE — DISCHARGE INSTRUCTIONS
Upper GI Endoscopy: What to Expect at Home    Your Recovery    You had an upper GI endoscopy. Your doctor used a thin, lighted tube that bends to look at the inside of your esophagus, your stomach, and the first part of the small intestine, called the duodenum.    After you have an endoscopy, you will stay at the hospital or clinic for 1 to 2 hours. This will allow the medicine to wear off. You will be able to go home after your doctor or nurse checks to make sure that you're not having any problems.    You may have to stay overnight if you had treatment during the test. You may have a sore throat for a day or two after the test.    This care sheet gives you a general idea about what to expect after the test.    How can you care for yourself at home?    Activity   Rest as much as you need to after you go home.  You should be able to go back to your usual activities the day after the test.    Diet   Follow your doctor's directions for eating after the test.  Drink plenty of fluids (unless your doctor has told you not to).    Medications   If you have a sore throat the day after the test, use an over-the-counter spray to numb your throat.    Results  Your doctor may have taken biopsies during your endoscopy. We will either call you with the results or discuss them with you at your follow up visit. If a biopsy was taken, avoid aspirin, ibuprofen, or other NSAIDs for 48 hours.    Follow-up care is a key part of your treatment and safety. Be sure to make and go to all appointments, and call your doctor if you are having problems. It's also a good idea to know your test results and keep a list of the medicines you take.    When should you call for help?   Call 911 anytime you think you may need emergency care. For example, call if:    You passed out (lost consciousness).     You have trouble breathing.     You pass maroon or bloody stools.   Call your doctor now or seek immediate medical care if:    You have pain that  fluid if you have a history of heart failure or if you experience any of the following symptoms:  Weight gain of 3 pounds or more overnight or 5 pounds in a week, increased swelling in our hands or feet or shortness of breath while lying flat in bed.  Please call your doctor as soon as you notice any of these symptoms; do not wait until your next office visit.        The discharge information has been reviewed with the patient.  The patient verbalized understanding.  Discharge medications reviewed with the patient and appropriate educational materials and side effects teaching were provided.  ___________________________________________________________________________________________________________________________________

## 2024-03-01 NOTE — INTERVAL H&P NOTE
Update History & Physical    The patient's History and Physical of February 22, 2023 was reviewed with the patient and I examined the patient. There was no change. The surgical site was confirmed by the patient and me.     Plan: The risks, benefits, expected outcome, and alternative to the recommended procedure have been discussed with the patient. Patient understands and wants to proceed with the procedure.     Electronically signed by Adi Mcintyre MD on 3/1/2024 at 8:44 AM

## 2024-03-01 NOTE — PERIOP NOTE
Patient /Family /Designee has been informed that [facility] is not responsible for patient belongings per policy and the signed [facility] Patient Agreement document.  Personal items should be sent home or checked in with security.  Patient /Family /Designee selected the following action:                            []  Send personal items home with a family member or friend                                                 []  Check in personal items with security, excluding clothing                            [x]  Maintain personal items at the bedside, against recommendation                                 by Zion Lee [facility]                                   ** If patient /family /designee chooses to maintain personal items at the bedside,                                      Complete the patient belongings inventory in the EMR.

## 2024-03-02 LAB
EKG ATRIAL RATE: 74 BPM
EKG DIAGNOSIS: NORMAL
EKG P AXIS: 44 DEGREES
EKG P-R INTERVAL: 134 MS
EKG Q-T INTERVAL: 388 MS
EKG QRS DURATION: 82 MS
EKG QTC CALCULATION (BAZETT): 430 MS
EKG R AXIS: 28 DEGREES
EKG T AXIS: 8 DEGREES
EKG VENTRICULAR RATE: 74 BPM

## 2024-03-02 PROCEDURE — 93010 ELECTROCARDIOGRAM REPORT: CPT | Performed by: INTERNAL MEDICINE

## 2024-03-27 ENCOUNTER — OFFICE VISIT (OUTPATIENT)
Age: 64
End: 2024-03-27
Payer: MEDICARE

## 2024-03-27 VITALS
HEIGHT: 60 IN | SYSTOLIC BLOOD PRESSURE: 139 MMHG | WEIGHT: 178 LBS | OXYGEN SATURATION: 99 % | TEMPERATURE: 97.6 F | BODY MASS INDEX: 34.95 KG/M2 | DIASTOLIC BLOOD PRESSURE: 85 MMHG | RESPIRATION RATE: 17 BRPM | HEART RATE: 60 BPM

## 2024-03-27 DIAGNOSIS — Z71.3 ENCOUNTER FOR WEIGHT LOSS COUNSELING: Primary | ICD-10-CM

## 2024-03-27 DIAGNOSIS — Z98.84 S/P GASTRIC BYPASS: ICD-10-CM

## 2024-03-27 DIAGNOSIS — E66.9 OBESITY (BMI 30.0-34.9): ICD-10-CM

## 2024-03-27 PROCEDURE — 1036F TOBACCO NON-USER: CPT | Performed by: REGISTERED NURSE

## 2024-03-27 PROCEDURE — 3075F SYST BP GE 130 - 139MM HG: CPT | Performed by: REGISTERED NURSE

## 2024-03-27 PROCEDURE — G8484 FLU IMMUNIZE NO ADMIN: HCPCS | Performed by: REGISTERED NURSE

## 2024-03-27 PROCEDURE — 1123F ACP DISCUSS/DSCN MKR DOCD: CPT | Performed by: REGISTERED NURSE

## 2024-03-27 PROCEDURE — 3079F DIAST BP 80-89 MM HG: CPT | Performed by: REGISTERED NURSE

## 2024-03-27 PROCEDURE — 3017F COLORECTAL CA SCREEN DOC REV: CPT | Performed by: REGISTERED NURSE

## 2024-03-27 PROCEDURE — 1090F PRES/ABSN URINE INCON ASSESS: CPT | Performed by: REGISTERED NURSE

## 2024-03-27 PROCEDURE — G8400 PT W/DXA NO RESULTS DOC: HCPCS | Performed by: REGISTERED NURSE

## 2024-03-27 PROCEDURE — G8417 CALC BMI ABV UP PARAM F/U: HCPCS | Performed by: REGISTERED NURSE

## 2024-03-27 PROCEDURE — 99213 OFFICE O/P EST LOW 20 MIN: CPT | Performed by: REGISTERED NURSE

## 2024-03-27 PROCEDURE — G8427 DOCREV CUR MEDS BY ELIG CLIN: HCPCS | Performed by: REGISTERED NURSE

## 2024-03-27 RX ORDER — LANOLIN ALCOHOL/MO/W.PET/CERES
100 CREAM (GRAM) TOPICAL DAILY
COMMUNITY

## 2024-03-27 RX ORDER — FLUTICASONE PROPIONATE 50 MCG
SPRAY, SUSPENSION (ML) NASAL
COMMUNITY
Start: 2024-02-27

## 2024-03-27 NOTE — PROGRESS NOTES
Bariatric Postoperative Nurse Note      Donna Carmichael is a 63 y.o. female status post laparoscopic gastric bypass surgery performed in 2016 by Dr. Ortega.    All Post-Ops (including two weeks)  -# of grams of protein daily? 30-60g  -sources of protein? Chicken, turkey, fish, protein shakes.  -# of oz of sugar free fluids from all sources daily? 64oz daily.  -Nausea? No  -Vomiting? No  -Difficulty swallow/food sticking? No  -Heartburn/regurgitation? No  -Character of bowel movements (diarrhea/constipation/bloody stools?) regular  -Which multivitamin product are you taking? Flintstones   -What dose and how frequently are you taking calcium citrate? 2x times daily.  - from any iron-containing multivitamin by 2 hours? No  -Ulcer risk exposures:   NSAID No  Tobacco No  Alcohol No  Steroids No  -Minutes of physical activity and what type? Average step count 5k-18k from working an active job.

## 2024-03-27 NOTE — PROGRESS NOTES
Bariatric Postoperative Progress Note    Chief Complaint   Patient presents with    Follow-up     Back on Track (LGBP in 2016)     Donna Carmichael is a 63 y.o. female now 8 years status post laparoscopic gastric bypass surgery performed in 2016.    Here for back on track and MWL. Weight ovidio: 147 lbs. Referred by Dr. Mcintyre. EGD on 3/1/2024    Esophagus: There was no esophagitis present. Z line noted at 35 cm. Diaphgragmatic pinch noted at 35 cm. There was no apparent hiatal hernia.      Gastric Pouch: Gastric pouch was insufflated and the length of the pouch was somewhat short at 3 cm. It was of normal width. Gastrojejunostomy appeared to be mildly dilated. There were no marginal ulcers or apparent fistulae present. There was no gastritis of the pouch.     Jejunum: The proximal Wendy limb was traversed. The blind end was 1 cm. The proximal Wendy limb was otherwise unremarkable.     See nurse note for additional subjective information.         3/27/2024    11:33 AM 3/1/2024     8:54 AM 2/28/2024    11:31 AM 2/22/2024     9:17 AM 11/20/2018     1:05 PM   Weight Loss Metrics   Pre-op weight (manual entry) 234 lbs   234 lbs    Height 5' 0\" 5' 0\" 5' 0\" 5' 0\" 5' 0\"   Weight - Scale 178 lbs 180 lbs 10 oz 180 lbs 180 lbs 156 lbs   BMI (Calculated) 34.8 kg/m2 35.3 kg/m2 35.2 kg/m2 35.2 kg/m2 30.5 kg/m2   Ideal body weight (manual entry) 119 lbs   120 lbs    EBW in lbs. 115   114    Weight loss to date in lbs. 56   54    Percent weight loss (%) 23.93 %   23.08 %    Percent EBW loss (%) 48.7 %   47.4 %        Past Medical History:   Diagnosis Date    Adverse effect of anesthesia     woke up during cholecystectomy    Asthma     Diabetes (HCC)     no meds    Hypertension     no meds    Menopause     Sleep apnea     no cpap     Past Surgical History:   Procedure Laterality Date    APPENDECTOMY      CHOLECYSTECTOMY      GI      gastric bypass 6/2016    HYSTERECTOMY (CERVIX STATUS UNKNOWN)      LAP GASTRIC

## 2024-05-08 ENCOUNTER — OFFICE VISIT (OUTPATIENT)
Age: 64
End: 2024-05-08
Payer: MEDICARE

## 2024-05-08 VITALS
DIASTOLIC BLOOD PRESSURE: 87 MMHG | WEIGHT: 176 LBS | HEART RATE: 74 BPM | RESPIRATION RATE: 17 BRPM | SYSTOLIC BLOOD PRESSURE: 132 MMHG | TEMPERATURE: 97 F | OXYGEN SATURATION: 99 % | BODY MASS INDEX: 34.55 KG/M2 | HEIGHT: 60 IN

## 2024-05-08 DIAGNOSIS — Z98.84 S/P GASTRIC BYPASS: ICD-10-CM

## 2024-05-08 DIAGNOSIS — Z71.3 ENCOUNTER FOR WEIGHT LOSS COUNSELING: Primary | ICD-10-CM

## 2024-05-08 DIAGNOSIS — E66.9 OBESITY (BMI 30.0-34.9): ICD-10-CM

## 2024-05-08 PROCEDURE — G8417 CALC BMI ABV UP PARAM F/U: HCPCS | Performed by: REGISTERED NURSE

## 2024-05-08 PROCEDURE — 1090F PRES/ABSN URINE INCON ASSESS: CPT | Performed by: REGISTERED NURSE

## 2024-05-08 PROCEDURE — 1036F TOBACCO NON-USER: CPT | Performed by: REGISTERED NURSE

## 2024-05-08 PROCEDURE — 99214 OFFICE O/P EST MOD 30 MIN: CPT | Performed by: REGISTERED NURSE

## 2024-05-08 PROCEDURE — 1123F ACP DISCUSS/DSCN MKR DOCD: CPT | Performed by: REGISTERED NURSE

## 2024-05-08 PROCEDURE — 3075F SYST BP GE 130 - 139MM HG: CPT | Performed by: REGISTERED NURSE

## 2024-05-08 PROCEDURE — G8427 DOCREV CUR MEDS BY ELIG CLIN: HCPCS | Performed by: REGISTERED NURSE

## 2024-05-08 PROCEDURE — G8400 PT W/DXA NO RESULTS DOC: HCPCS | Performed by: REGISTERED NURSE

## 2024-05-08 PROCEDURE — 3079F DIAST BP 80-89 MM HG: CPT | Performed by: REGISTERED NURSE

## 2024-05-08 PROCEDURE — 3017F COLORECTAL CA SCREEN DOC REV: CPT | Performed by: REGISTERED NURSE

## 2024-05-08 RX ORDER — DIETHYLPROPION HYDROCHLORIDE 75 MG/1
75 TABLET ORAL DAILY
Qty: 30 TABLET | Refills: 0 | Status: SHIPPED | OUTPATIENT
Start: 2024-05-08 | End: 2024-06-07

## 2024-05-08 NOTE — PROGRESS NOTES
Bariatric Postoperative Nurse Note      Donnajuancarlos Carmichael is a 63 y.o. female status post laparoscopic gastric bypass surgery performed on 2016.    All Post-Ops (including two weeks)  -# of grams of protein daily? 60g  -sources of protein? Chicken, Turkey, Fish, Protein shakes.  -# of oz of sugar free fluids from all sources daily? 64oz daily.  -Nausea? No  -Vomiting? No  -Difficulty swallow/food sticking? No  -Heartburn/regurgitation? No  -Character of bowel movements (diarrhea/constipation/bloody stools?) regular  -Which multivitamin product are you taking? Flintstones   -What dose and how frequently are you taking calcium citrate? 1200mg daily.  - from any iron-containing multivitamin by 2 hours? Yes  -Ulcer risk exposures:   NSAID No  Tobacco No  Alcohol No  Steroids No  -Minutes of physical activity and what type? Average step count 5k-8k steps.        
atraumatic.   Eyes:      Pupils: Pupils are equal, round, and reactive to light.   Cardiovascular:      Rate and Rhythm: Normal rate and regular rhythm.   Pulmonary:      Effort: Pulmonary effort is normal.      Breath sounds: Normal breath sounds.   Musculoskeletal:         General: Normal range of motion.   Neurological:      Mental Status: Is alert and oriented to person, place, and time.   Psychiatric:         Mood and Affect: Mood normal.         Behavior: Behavior normal.         Thought Content: Thought content normal.     Labs:   No results found for this or any previous visit (from the past 672 hour(s)).    Assessment:  8 years s/p LGBP, experiencing weight regain. Pre op weight: 234 lbs    Plan:   Doing well. Pt was instructed to continue recommended bariatric vitamin supplementation.     Agreeable to start diethylpropion to complement dietary efforts. EKG in March 2024: NSR. Reviewed potential SE, risks, cost, and benefits. Order 75 mg tabs, #30, R0--pt understands will need monthly visits.    We reviewed the components of a successful postoperative course including requirement for a high protein, low carbohydrate diet, 64 oz a day of zero calorie liquids, daily vitamin supplementation, daily exercise (150 mis/week), regular follow-up, and participation in support groups.    Refer to registered dietitian for more detailed medical nutrition therapy as needed. Will schedule appt with ARELIS Young RD.     The primary encounter diagnosis was Encounter for weight loss counseling. Diagnoses of S/P gastric bypass and Obesity (BMI 30.0-34.9) were also pertinent to this visit.     Return in about 4 weeks (around 6/5/2024) for Medication follow up, Weight management.     Sooner as needed.  EDITH Alba NP

## 2024-05-09 ENCOUNTER — TELEPHONE (OUTPATIENT)
Age: 64
End: 2024-05-09

## 2024-06-11 ENCOUNTER — OFFICE VISIT (OUTPATIENT)
Age: 64
End: 2024-06-11
Payer: MEDICARE

## 2024-06-11 VITALS
BODY MASS INDEX: 34.36 KG/M2 | DIASTOLIC BLOOD PRESSURE: 89 MMHG | SYSTOLIC BLOOD PRESSURE: 136 MMHG | OXYGEN SATURATION: 99 % | RESPIRATION RATE: 18 BRPM | TEMPERATURE: 97.9 F | WEIGHT: 175 LBS | HEIGHT: 60 IN | HEART RATE: 81 BPM

## 2024-06-11 DIAGNOSIS — Z79.899 FOLLOW-UP ENCOUNTER INVOLVING MEDICATION: Primary | ICD-10-CM

## 2024-06-11 DIAGNOSIS — E66.9 OBESITY (BMI 30.0-34.9): ICD-10-CM

## 2024-06-11 DIAGNOSIS — Z71.3 ENCOUNTER FOR WEIGHT LOSS COUNSELING: ICD-10-CM

## 2024-06-11 DIAGNOSIS — Z98.84 S/P GASTRIC BYPASS: ICD-10-CM

## 2024-06-11 PROCEDURE — G8427 DOCREV CUR MEDS BY ELIG CLIN: HCPCS | Performed by: REGISTERED NURSE

## 2024-06-11 PROCEDURE — 3075F SYST BP GE 130 - 139MM HG: CPT | Performed by: REGISTERED NURSE

## 2024-06-11 PROCEDURE — G8400 PT W/DXA NO RESULTS DOC: HCPCS | Performed by: REGISTERED NURSE

## 2024-06-11 PROCEDURE — G8417 CALC BMI ABV UP PARAM F/U: HCPCS | Performed by: REGISTERED NURSE

## 2024-06-11 PROCEDURE — 99214 OFFICE O/P EST MOD 30 MIN: CPT | Performed by: REGISTERED NURSE

## 2024-06-11 PROCEDURE — 1036F TOBACCO NON-USER: CPT | Performed by: REGISTERED NURSE

## 2024-06-11 PROCEDURE — 3017F COLORECTAL CA SCREEN DOC REV: CPT | Performed by: REGISTERED NURSE

## 2024-06-11 PROCEDURE — 3079F DIAST BP 80-89 MM HG: CPT | Performed by: REGISTERED NURSE

## 2024-06-11 PROCEDURE — 1123F ACP DISCUSS/DSCN MKR DOCD: CPT | Performed by: REGISTERED NURSE

## 2024-06-11 PROCEDURE — 1090F PRES/ABSN URINE INCON ASSESS: CPT | Performed by: REGISTERED NURSE

## 2024-06-11 RX ORDER — FLUTICASONE FUROATE 100 UG/1
POWDER RESPIRATORY (INHALATION)
COMMUNITY
Start: 2024-05-30

## 2024-06-11 RX ORDER — DIETHYLPROPION HYDROCHLORIDE 75 MG/1
75 TABLET ORAL DAILY
Qty: 30 TABLET | Refills: 0 | Status: SHIPPED | OUTPATIENT
Start: 2024-06-11 | End: 2024-07-11

## 2024-06-11 NOTE — PROGRESS NOTES
Bariatric Postoperative Nurse Note      Donnajuancarlos Carmichael is a 63 y.o. female status post laparoscopic gastric bypass surgery performed in 2016.    All Post-Ops (including two weeks)  -# of grams of protein daily? 60g  -sources of protein? Chicken, Fish, Turkey.  -# of oz of sugar free fluids from all sources daily? 64oz daily.  -Nausea? No  -Vomiting? No  -Difficulty swallow/food sticking? No  -Heartburn/regurgitation? No  -Character of bowel movements (diarrhea/constipation/bloody stools?) regular  -Which multivitamin product are you taking? Flintstones   -What dose and how frequently are you taking calcium citrate? 500mg chews 3 times daily.  - from any iron-containing multivitamin by 2 hours? Yes  -Ulcer risk exposures:   NSAID No  Tobacco No  Alcohol No  Steroids Yes Hx of asthma  -Minutes of physical activity and what type? Average step count 5k-8k steps.

## 2024-06-12 NOTE — PROGRESS NOTES
Bariatric Postoperative Progress Note    Chief Complaint   Patient presents with    Weight Management     LGBP (2016)     Donna Carmichael is a 63 y.o. female now 8 years status post laparoscopic gastric bypass surgery performed in 2016.    Here for weight management. -1 lbs since last visit. Recently out of town helping a friend. Was unable to  diethylpropion but still interested in trying.     See nurse note for additional subjective information.         6/11/2024    10:12 AM 5/8/2024    10:00 AM 3/27/2024    11:33 AM 3/1/2024     8:54 AM 2/28/2024    11:31 AM 2/22/2024     9:17 AM 11/20/2018     1:05 PM   Weight Loss Metrics   Pre-op weight (manual entry) 234 lbs 234 lbs 234 lbs   234 lbs    Height 5' 0\" 5' 0\" 5' 0\" 5' 0\" 5' 0\" 5' 0\" 5' 0\"   Weight - Scale 175 lbs 176 lbs 178 lbs 180 lbs 10 oz 180 lbs 180 lbs 156 lbs   BMI (Calculated) 34.2 kg/m2 34.4 kg/m2 34.8 kg/m2 35.3 kg/m2 35.2 kg/m2 35.2 kg/m2 30.5 kg/m2   Ideal body weight (manual entry) 119 lbs 119 lbs 119 lbs   120 lbs    EBW in lbs. 115 115 115   114    Weight loss to date in lbs. 59 58 56   54    Percent weight loss (%) 25.21 % 24.79 % 23.93 %   23.08 %    Percent EBW loss (%) 51.3 % 50.4 % 48.7 %   47.4 %        Past Medical History:   Diagnosis Date    Adverse effect of anesthesia     woke up during cholecystectomy    Asthma     Diabetes (HCC)     no meds    Hypertension     no meds    Menopause     Sleep apnea     no cpap     Past Surgical History:   Procedure Laterality Date    APPENDECTOMY      CHOLECYSTECTOMY      GI      gastric bypass 6/2016    HYSTERECTOMY (CERVIX STATUS UNKNOWN)      LAP GASTRIC BYPASS/AIYANA-EN-Y  6-27-16    Dr. Pak    LAPAROSCOPY  1978    OVARY REMOVAL      UPPER GASTROINTESTINAL ENDOSCOPY N/A 3/1/2024    ESOPHAGOGASTRODUODENOSCOPY WITH BIOPSY performed by Adi Mcintyre MD at [facility] ENDOSCOPY     Current Outpatient Medications   Medication Sig Dispense Refill    ARNUITY ELLIPTA 100 MCG/ACT AEPB

## 2024-07-17 ENCOUNTER — HOSPITAL ENCOUNTER (OUTPATIENT)
Facility: HOSPITAL | Age: 64
Discharge: HOME | End: 2024-07-20

## 2024-07-17 ENCOUNTER — CLINICAL DOCUMENTATION (OUTPATIENT)
Facility: HOSPITAL | Age: 64
End: 2024-07-17

## 2024-07-17 NOTE — PROGRESS NOTES
[facility]  [address], [address]      Patient's Name: Donna Carmichael     Age: 64 y.o.  YOB: 1960     Sex: female    Date:   7/17/2024    Height: 5 f3   Weight:    169      Lbs.     BMI: 29.9     Surgery Date: 2016    Surgeon: Dr. Pak    Starting Weight: 234     Last Recorded Weight: 6/11/24:  175  Overall Pounds Lost: 65    Goal: 150-155     Procedure: Gastric Bypass    Lowest Weight patient has achieved since surgery: 145 and stayed at 155 for 4 years.  She states covid hit and gained weight.    How long has patient been at this weight for: She has been here for 1 month    Contributing Factors to Weight Gain: COVID    Are you currently taking anti-obesity medications?   Yes.  diethylpropion  for 1 month.     If yes, how long have you been on this for?  1 month    Have you seen a difference in your appetite or weight?   No appetite.         Diet History (reported by patient on diet history form)    Do you smoke: None    Alcohol Intake: 0 drinks at a time, 0 times a week.    Meals per day: Maybe 2.       Diet History:  Breakfast: Premier protein shake and coffee.  Breakfast-Lunch:  None.  Lunch (timing varies, no scheduled):  She states she doesn't have routine food that she eats, may do another protein shake.  She states the shake from breakfast may carry on to lunch.  Lunch-Dinner:  None.  Dinner:  Meat and vegetable.  She states carbohydrates tend to run he blood sugar up.  She states she does a lot of chicken, turkey, seafood.     After dinner eating:   nuts, veggie sticks.       Portion sizes ~: Hand size.      Does patient still feel restriction?  Yes    Does patient tend to eat a portion, get full, and stop, or does patient tend to eat a portion, get full, stop, but then go back to it?  She states she may eat a portion, get full, and stop and then return to it.    Patient is taking 10 minutes to eat a

## 2024-07-19 ENCOUNTER — OFFICE VISIT (OUTPATIENT)
Age: 64
End: 2024-07-19

## 2024-07-19 VITALS
DIASTOLIC BLOOD PRESSURE: 89 MMHG | TEMPERATURE: 97 F | BODY MASS INDEX: 30.91 KG/M2 | HEIGHT: 62 IN | HEART RATE: 98 BPM | WEIGHT: 168 LBS | SYSTOLIC BLOOD PRESSURE: 135 MMHG

## 2024-07-19 DIAGNOSIS — Z71.3 ENCOUNTER FOR WEIGHT LOSS COUNSELING: ICD-10-CM

## 2024-07-19 DIAGNOSIS — Z98.84 S/P GASTRIC BYPASS: ICD-10-CM

## 2024-07-19 DIAGNOSIS — Z79.899 FOLLOW-UP ENCOUNTER INVOLVING MEDICATION: Primary | ICD-10-CM

## 2024-07-19 DIAGNOSIS — E66.9 OBESITY (BMI 30.0-34.9): ICD-10-CM

## 2024-07-19 RX ORDER — DIETHYLPROPION HYDROCHLORIDE 75 MG/1
75 TABLET ORAL DAILY
Qty: 30 TABLET | Refills: 1 | Status: SHIPPED | OUTPATIENT
Start: 2024-07-19 | End: 2024-08-18

## 2024-07-19 NOTE — PROGRESS NOTES
[facility] Nurse Note:     CC: Weight Management    Donna Carmichael is a 64 y.o. female who is here for her f/up medical provider visit for the Medication program.     Did you have any problems adhering to the program since last visit? No  If yes, please explain:     Since your last visit, have you experienced any complications? No    Have you received any other medical care since last visit? Yes  If yes, where and for what? PCP     Have you had any change in your medications since your last visit? No If yes what?     Are you taking an anti-obesity medication? Yes If yes what and are you experiencing any side effects?  Diethylpropion, no SE.    Would you still like to continue your current medication and dosage? Yes    BP Readings from Last 3 Encounters:   06/11/24 136/89   05/08/24 132/87   03/27/24 139/85      Eating Habits Over Last Week:    How many oz of sugar-free fluids are you consuming? 40-60 oz     Did you consume your prescribed meal replacement regimen each day? Yes, 1 MR daily     Physical Activity Routine:    Aerobic exercise: 0    Resistance exercise: 0    
months (around 9/19/2024) for Medication follow up, Weight management.     Sooner as needed.  EDITH Alba - NP

## 2024-10-04 ENCOUNTER — OFFICE VISIT (OUTPATIENT)
Age: 64
End: 2024-10-04
Payer: MEDICARE

## 2024-10-04 VITALS
HEART RATE: 80 BPM | SYSTOLIC BLOOD PRESSURE: 141 MMHG | BODY MASS INDEX: 31.41 KG/M2 | WEIGHT: 160 LBS | TEMPERATURE: 97 F | DIASTOLIC BLOOD PRESSURE: 82 MMHG | HEIGHT: 60 IN

## 2024-10-04 DIAGNOSIS — E66.811 OBESITY (BMI 30.0-34.9): ICD-10-CM

## 2024-10-04 DIAGNOSIS — Z98.84 S/P GASTRIC BYPASS: ICD-10-CM

## 2024-10-04 DIAGNOSIS — Z79.899 FOLLOW-UP ENCOUNTER INVOLVING MEDICATION: Primary | ICD-10-CM

## 2024-10-04 DIAGNOSIS — Z71.3 ENCOUNTER FOR WEIGHT LOSS COUNSELING: ICD-10-CM

## 2024-10-04 PROCEDURE — 3017F COLORECTAL CA SCREEN DOC REV: CPT | Performed by: REGISTERED NURSE

## 2024-10-04 PROCEDURE — 99214 OFFICE O/P EST MOD 30 MIN: CPT | Performed by: REGISTERED NURSE

## 2024-10-04 PROCEDURE — G8428 CUR MEDS NOT DOCUMENT: HCPCS | Performed by: REGISTERED NURSE

## 2024-10-04 PROCEDURE — 3077F SYST BP >= 140 MM HG: CPT | Performed by: REGISTERED NURSE

## 2024-10-04 PROCEDURE — G8400 PT W/DXA NO RESULTS DOC: HCPCS | Performed by: REGISTERED NURSE

## 2024-10-04 PROCEDURE — 1036F TOBACCO NON-USER: CPT | Performed by: REGISTERED NURSE

## 2024-10-04 PROCEDURE — 1090F PRES/ABSN URINE INCON ASSESS: CPT | Performed by: REGISTERED NURSE

## 2024-10-04 PROCEDURE — G8417 CALC BMI ABV UP PARAM F/U: HCPCS | Performed by: REGISTERED NURSE

## 2024-10-04 PROCEDURE — 1123F ACP DISCUSS/DSCN MKR DOCD: CPT | Performed by: REGISTERED NURSE

## 2024-10-04 PROCEDURE — G8484 FLU IMMUNIZE NO ADMIN: HCPCS | Performed by: REGISTERED NURSE

## 2024-10-04 PROCEDURE — 3079F DIAST BP 80-89 MM HG: CPT | Performed by: REGISTERED NURSE

## 2024-10-04 RX ORDER — DIETHYLPROPION HYDROCHLORIDE 75 MG/1
75 TABLET, EXTENDED RELEASE ORAL DAILY
Qty: 30 TABLET | Refills: 2 | Status: SHIPPED | OUTPATIENT
Start: 2024-10-04 | End: 2025-01-02

## 2024-10-07 NOTE — PROGRESS NOTES
Bariatric Postoperative Nurse Note    Donna Dietrichamanda Carmichael is a 64 y.o. female status post laparoscopic gastric bypass surgery performed in 2016.    All Post-Ops (including two weeks)  -# of grams of protein daily? Unknown, roughly 60 g   -sources of protein? Protein shake, steak, chicken, pork  -# of oz of sugar free fluids from all sources daily? 32-50 oz  -Nausea? No  -Vomiting? No  -Difficulty swallow/food sticking? No  -Heartburn/regurgitation? No  -Character of bowel movements (diarrhea/constipation/bloody stools?) regular  -Which multivitamin product are you taking? Flintstones   -What dose and how frequently are you taking calcium citrate? Once a day  - from any iron-containing multivitamin by 2 hours? Yes  -Ulcer risk exposures:   NSAID No  Tobacco No  Alcohol No  Steroids No  -Minutes of physical activity and what type? Staying active with grandchildren   
months (around 1/4/2025) for Medication follow up, Weight management.     Sooner as needed.  EDITH Alba - NP

## 2024-10-28 ENCOUNTER — TELEPHONE (OUTPATIENT)
Age: 64
End: 2024-10-28

## 2024-10-28 NOTE — TELEPHONE ENCOUNTER
Patient left message requesting a banana bag due to large amounts of cramping of ribs, sue horses, cramps in toes and calf's. States not getting enough fluid intake think is dehydrated and urinating fine.

## 2024-10-30 NOTE — TELEPHONE ENCOUNTER
Patient returned call - I relayed advice given by Karina DEL ANGEL. Patient states she will try the Liquid IV as Pedialyte and Gatorade did not seem to help. Patient understood to report to the ER if symptoms worsen.

## 2024-11-12 ENCOUNTER — HOSPITAL ENCOUNTER (OUTPATIENT)
Facility: HOSPITAL | Age: 64
Discharge: HOME | End: 2024-11-15

## 2024-11-12 ENCOUNTER — CLINICAL DOCUMENTATION (OUTPATIENT)
Facility: HOSPITAL | Age: 64
End: 2024-11-12

## 2024-11-12 NOTE — PROGRESS NOTES
[facility]  [address], [address]    Nutrition Follow up Progress Note      Patient's Name: Donna Carmichael   Age: 64 y.o.  YOB: 1960   Sex: female    Date:   11/12/2024    Surgery Date: 2016    Height: 5 f 3   Starting Weight: 234  Current Weight:    152          Overall Pounds Lost: 82  Last Recorded Weight: 7/17/24:  169  BMI: 26.9     Procedure: Gastric Bypass    Patient is taking diethylpropion 1/2 tablet, next day full tablet, 1/2 tablet rotation.  She would like to come off of it since she is at her goal weight.       Last Nutrition Concerns: Weight Regain    Changes to diet since last visit:    States she is not eating as much as she was.  She states she is staying full longer.  Moving more, eating less.      Alcohol intake: None  Nicotine use: None    Diet History: ~ 2 meals per day.      Breakfast:  Premier shake and coffee in the morning.    Breakfast-Lunch:  None  Lunch:  Patient states breakfast and lunch run together, but states she may have some fruit and nuts for lunch.     Dinner:  More meat and vegetables.    After dinner:  States lately no.     She feels full quicker since taking the medication and states it is less than palm size now.    Fluid intake:  She states she is doing well with her fluid intake.  16 ounces bottles of Propel x 3 plus her protein shake puts her at 64 ounces    Patient is currently taking in protein supplements.    Approximate protein intake: 30 grams from shake.  She does aim for 2 per day.      Exercise Level: Patient is getting 5000 steps per day.       Vitamin Intake: Patient is taking 1 [place] Complete per day.     Patient is taking Calcium in the form of Citracal .  Patient is taking 500 mg per day.     Patient is taking 100 mg of Vitamin B1, 1000 mcg of Vitamin B complex, and 5000 IU of Vitamin D3.         Patient is doing well.  She has lost 17 pounds since our

## 2025-04-08 DIAGNOSIS — E66.811 OBESITY (BMI 30.0-34.9): ICD-10-CM

## 2025-04-08 DIAGNOSIS — Z79.899 FOLLOW-UP ENCOUNTER INVOLVING MEDICATION: ICD-10-CM

## 2025-04-08 DIAGNOSIS — Z71.3 ENCOUNTER FOR WEIGHT LOSS COUNSELING: ICD-10-CM

## 2025-04-08 DIAGNOSIS — Z98.84 S/P GASTRIC BYPASS: ICD-10-CM

## 2025-04-14 RX ORDER — DIETHYLPROPION HYDROCHLORIDE 75 MG/1
75 TABLET, EXTENDED RELEASE ORAL DAILY
Qty: 30 TABLET | Refills: 2 | Status: SHIPPED | OUTPATIENT
Start: 2025-04-14 | End: 2025-07-13

## (undated) DEVICE — MEDI-VAC NON-CONDUCTIVE SUCTION TUBING: Brand: CARDINAL HEALTH

## (undated) DEVICE — GAUZE,SPONGE,4"X4",16PLY,STRL,LF,10/TRAY: Brand: MEDLINE

## (undated) DEVICE — YANKAUER,SMOOTH HANDLE,HIGH CAPACITY: Brand: MEDLINE INDUSTRIES, INC.

## (undated) DEVICE — STERILE POLYISOPRENE POWDER-FREE SURGICAL GLOVES: Brand: PROTEXIS

## (undated) DEVICE — CANNULA NSL AD TBNG L14FT STD PVC O2 CRV CONN NONFLARED NSL

## (undated) DEVICE — ENDOSCOPY PUMP TUBING/ CAP SET: Brand: ERBE

## (undated) DEVICE — UNDERPAD INCONT W23XL36IN STD BLU POLYPR BK FLUF SFT

## (undated) DEVICE — BASIN EMSIS 16OZ GRAPHITE PLAS KID SHP MOLD GRAD FOR ORAL

## (undated) DEVICE — SYRINGE MED 50ML LUERSLIP TIP

## (undated) DEVICE — SYRINGE MED 25GA 3ML L5/8IN SUBQ PLAS W/ DETACH NDL SFTY

## (undated) DEVICE — LINER SUCT CANSTR 3000CC PLAS SFT PRE ASSEMB W/OUT TBNG W/

## (undated) DEVICE — FORCEPS BX L240CM JAW DIA2.4MM ORNG L CAP W/ NDL DISP RAD

## (undated) DEVICE — SOLUTION IRRIG 1000ML H2O STRL BLT

## (undated) DEVICE — BITE BLOCK ENDOSCP UNIV AD 6 TO 9.4 MM

## (undated) DEVICE — CATHETER SUCT TR FL TIP 14FR W/ O CTRL